# Patient Record
Sex: MALE | Race: WHITE | Employment: FULL TIME | URBAN - METROPOLITAN AREA
[De-identification: names, ages, dates, MRNs, and addresses within clinical notes are randomized per-mention and may not be internally consistent; named-entity substitution may affect disease eponyms.]

---

## 2017-10-21 ENCOUNTER — HOSPITAL ENCOUNTER (EMERGENCY)
Age: 37
Discharge: HOME | End: 2017-10-21
Attending: EMERGENCY MEDICINE
Payer: COMMERCIAL

## 2017-10-21 ENCOUNTER — APPOINTMENT (OUTPATIENT)
Dept: CT IMAGING | Age: 37
End: 2017-10-21
Attending: EMERGENCY MEDICINE
Payer: COMMERCIAL

## 2017-10-21 VITALS
WEIGHT: 210 LBS | OXYGEN SATURATION: 99 % | RESPIRATION RATE: 19 BRPM | HEIGHT: 71 IN | TEMPERATURE: 99.1 F | BODY MASS INDEX: 29.4 KG/M2 | SYSTOLIC BLOOD PRESSURE: 103 MMHG | DIASTOLIC BLOOD PRESSURE: 63 MMHG | HEART RATE: 105 BPM

## 2017-10-21 DIAGNOSIS — D69.6 THROMBOCYTOPENIA (HCC): ICD-10-CM

## 2017-10-21 DIAGNOSIS — R10.32 ABDOMINAL PAIN, LLQ (LEFT LOWER QUADRANT): ICD-10-CM

## 2017-10-21 DIAGNOSIS — R11.2 INTRACTABLE VOMITING WITH NAUSEA, UNSPECIFIED VOMITING TYPE: ICD-10-CM

## 2017-10-21 DIAGNOSIS — D72.819 LEUKOPENIA, UNSPECIFIED TYPE: Primary | ICD-10-CM

## 2017-10-21 LAB
ALBUMIN SERPL-MCNC: 3.9 G/DL (ref 3.5–5)
ALBUMIN/GLOB SERPL: 1 {RATIO} (ref 1.2–3.5)
ALP SERPL-CCNC: 68 U/L (ref 50–136)
ALT SERPL-CCNC: 62 U/L (ref 12–65)
ANION GAP SERPL CALC-SCNC: 11 MMOL/L (ref 7–16)
AST SERPL-CCNC: 73 U/L (ref 15–37)
BASOPHILS # BLD: 0 K/UL (ref 0–0.2)
BASOPHILS NFR BLD: 0 % (ref 0–2)
BILIRUB SERPL-MCNC: 0.5 MG/DL (ref 0.2–1.1)
BUN SERPL-MCNC: 13 MG/DL (ref 6–23)
CALCIUM SERPL-MCNC: 8.6 MG/DL (ref 8.3–10.4)
CHLORIDE SERPL-SCNC: 98 MMOL/L (ref 98–107)
CO2 SERPL-SCNC: 24 MMOL/L (ref 21–32)
CREAT SERPL-MCNC: 1.06 MG/DL (ref 0.8–1.5)
DIFFERENTIAL METHOD BLD: ABNORMAL
EOSINOPHIL # BLD: 0 K/UL (ref 0–0.8)
EOSINOPHIL NFR BLD: 0 % (ref 0.5–7.8)
ERYTHROCYTE [DISTWIDTH] IN BLOOD BY AUTOMATED COUNT: 13.8 % (ref 11.9–14.6)
GLOBULIN SER CALC-MCNC: 3.9 G/DL (ref 2.3–3.5)
GLUCOSE SERPL-MCNC: 101 MG/DL (ref 65–100)
HCT VFR BLD AUTO: 43.2 % (ref 41.1–50.3)
HGB BLD-MCNC: 15 G/DL (ref 13.6–17.2)
HIV1 P24 AG SERPL QL IA: NONREACTIVE
HIV1+2 AB SERPL QL IA: NONREACTIVE
IMM GRANULOCYTES # BLD: 0 K/UL (ref 0–0.5)
IMM GRANULOCYTES NFR BLD: 0 % (ref 0–5)
LACTATE BLD-SCNC: 0.8 MMOL/L (ref 0.5–1.9)
LIPASE SERPL-CCNC: 239 U/L (ref 73–393)
LYMPHOCYTES # BLD: 0.9 K/UL (ref 0.5–4.6)
LYMPHOCYTES NFR BLD: 29 % (ref 13–44)
MCH RBC QN AUTO: 31.5 PG (ref 26.1–32.9)
MCHC RBC AUTO-ENTMCNC: 34.7 G/DL (ref 31.4–35)
MCV RBC AUTO: 90.8 FL (ref 79.6–97.8)
MONOCYTES # BLD: 0.3 K/UL (ref 0.1–1.3)
MONOCYTES NFR BLD: 10 % (ref 4–12)
NEUTS SEG # BLD: 1.9 K/UL (ref 1.7–8.2)
NEUTS SEG NFR BLD: 61 % (ref 43–78)
PLATELET # BLD AUTO: 99 K/UL (ref 150–450)
PMV BLD AUTO: 12.8 FL (ref 10.8–14.1)
POTASSIUM SERPL-SCNC: 3.5 MMOL/L (ref 3.5–5.1)
PROCALCITONIN SERPL-MCNC: <0.1 NG/ML
PROT SERPL-MCNC: 7.8 G/DL (ref 6.3–8.2)
RBC # BLD AUTO: 4.76 M/UL (ref 4.23–5.67)
SODIUM SERPL-SCNC: 133 MMOL/L (ref 136–145)
WBC # BLD AUTO: 3.1 K/UL (ref 4.3–11.1)

## 2017-10-21 PROCEDURE — 85025 COMPLETE CBC W/AUTO DIFF WBC: CPT | Performed by: EMERGENCY MEDICINE

## 2017-10-21 PROCEDURE — 74011000258 HC RX REV CODE- 258: Performed by: EMERGENCY MEDICINE

## 2017-10-21 PROCEDURE — 87040 BLOOD CULTURE FOR BACTERIA: CPT | Performed by: EMERGENCY MEDICINE

## 2017-10-21 PROCEDURE — 93005 ELECTROCARDIOGRAM TRACING: CPT | Performed by: EMERGENCY MEDICINE

## 2017-10-21 PROCEDURE — 83605 ASSAY OF LACTIC ACID: CPT

## 2017-10-21 PROCEDURE — 99284 EMERGENCY DEPT VISIT MOD MDM: CPT | Performed by: EMERGENCY MEDICINE

## 2017-10-21 PROCEDURE — 74177 CT ABD & PELVIS W/CONTRAST: CPT

## 2017-10-21 PROCEDURE — 84145 PROCALCITONIN (PCT): CPT | Performed by: EMERGENCY MEDICINE

## 2017-10-21 PROCEDURE — 83690 ASSAY OF LIPASE: CPT | Performed by: EMERGENCY MEDICINE

## 2017-10-21 PROCEDURE — 96365 THER/PROPH/DIAG IV INF INIT: CPT | Performed by: EMERGENCY MEDICINE

## 2017-10-21 PROCEDURE — 74011636320 HC RX REV CODE- 636/320: Performed by: EMERGENCY MEDICINE

## 2017-10-21 PROCEDURE — 96375 TX/PRO/DX INJ NEW DRUG ADDON: CPT | Performed by: EMERGENCY MEDICINE

## 2017-10-21 PROCEDURE — 96361 HYDRATE IV INFUSION ADD-ON: CPT | Performed by: EMERGENCY MEDICINE

## 2017-10-21 PROCEDURE — 87389 HIV-1 AG W/HIV-1&-2 AB AG IA: CPT | Performed by: EMERGENCY MEDICINE

## 2017-10-21 PROCEDURE — 80053 COMPREHEN METABOLIC PANEL: CPT | Performed by: EMERGENCY MEDICINE

## 2017-10-21 PROCEDURE — 74011250636 HC RX REV CODE- 250/636: Performed by: EMERGENCY MEDICINE

## 2017-10-21 RX ORDER — ONDANSETRON 4 MG/1
4 TABLET, ORALLY DISINTEGRATING ORAL
Qty: 6 TAB | Refills: 1 | Status: SHIPPED | OUTPATIENT
Start: 2017-10-21 | End: 2017-10-25

## 2017-10-21 RX ORDER — SODIUM CHLORIDE 0.9 % (FLUSH) 0.9 %
10 SYRINGE (ML) INJECTION
Status: COMPLETED | OUTPATIENT
Start: 2017-10-21 | End: 2017-10-21

## 2017-10-21 RX ORDER — ESCITALOPRAM OXALATE 10 MG/1
10 TABLET ORAL DAILY
COMMUNITY

## 2017-10-21 RX ORDER — SODIUM CHLORIDE 0.9 % (FLUSH) 0.9 %
5-10 SYRINGE (ML) INJECTION AS NEEDED
Status: DISCONTINUED | OUTPATIENT
Start: 2017-10-21 | End: 2017-10-22 | Stop reason: HOSPADM

## 2017-10-21 RX ORDER — ALPRAZOLAM 1 MG/1
1 TABLET ORAL
COMMUNITY

## 2017-10-21 RX ORDER — ONDANSETRON 2 MG/ML
4 INJECTION INTRAMUSCULAR; INTRAVENOUS
Status: COMPLETED | OUTPATIENT
Start: 2017-10-21 | End: 2017-10-21

## 2017-10-21 RX ORDER — MORPHINE SULFATE 2 MG/ML
4 INJECTION, SOLUTION INTRAMUSCULAR; INTRAVENOUS ONCE
Status: COMPLETED | OUTPATIENT
Start: 2017-10-21 | End: 2017-10-21

## 2017-10-21 RX ADMIN — Medication 10 ML: at 20:32

## 2017-10-21 RX ADMIN — IOPAMIDOL 100 ML: 755 INJECTION, SOLUTION INTRAVENOUS at 20:32

## 2017-10-21 RX ADMIN — SODIUM CHLORIDE 2859 ML: 900 INJECTION, SOLUTION INTRAVENOUS at 18:14

## 2017-10-21 RX ADMIN — PIPERACILLIN SODIUM,TAZOBACTAM SODIUM 4.5 G: 4; .5 INJECTION, POWDER, FOR SOLUTION INTRAVENOUS at 19:34

## 2017-10-21 RX ADMIN — MORPHINE SULFATE 4 MG: 2 INJECTION, SOLUTION INTRAMUSCULAR; INTRAVENOUS at 18:14

## 2017-10-21 RX ADMIN — SODIUM CHLORIDE 100 ML: 900 INJECTION, SOLUTION INTRAVENOUS at 20:32

## 2017-10-21 RX ADMIN — DIATRIZOATE MEGLUMINE AND DIATRIZOATE SODIUM 30 ML: 660; 100 LIQUID ORAL; RECTAL at 18:14

## 2017-10-21 RX ADMIN — ONDANSETRON 4 MG: 2 INJECTION INTRAMUSCULAR; INTRAVENOUS at 18:14

## 2017-10-21 NOTE — ED PROVIDER NOTES
HPI Comments: 33-year-old male with no pertinent past medical history presents with complaint of sharp cramping left lower quadrant pain without radiation ×1 week with associated nausea and vomiting. Patient reports subjective fever at home. States that he has had decreased po intake since Monday. Patient states last meal that he was able to keep down was on Monday reports worsening generalized weakness and fatigue. Denies chest pain, shortness of breath, diarrhea, constipation, melena, bright red blood in stools. Reports intermittent bright red blood with wiping the believes is secondary to straining. He states he's never had symptoms similar to this in the past. Denies previous abdominal surgeries. Patient is a 43 y.o. male presenting with abdominal pain. The history is provided by the patient. No  was used. Abdominal Pain    This is a chronic problem. Episode onset: 1 week. The problem occurs constantly. The problem has not changed since onset. The pain is associated with vomiting. The pain is located in the LLQ. The quality of the pain is cramping and sharp. The pain is at a severity of 4/10. The pain is moderate. Associated symptoms include anorexia, a fever, nausea and vomiting. Pertinent negatives include no belching, no diarrhea, no flatus, no melena, no constipation, no dysuria, no frequency, no hematuria, no headaches, no myalgias, no chest pain, no testicular pain and no back pain. The pain is worsened by palpation. The pain is relieved by nothing. His past medical history does not include gallstones, ulcerative colitis, Crohn's disease, UTI, pancreatitis or kidney stones. The patient's surgical history non-contributory. Past Medical History:   Diagnosis Date    Psychiatric disorder        History reviewed. No pertinent surgical history. History reviewed. No pertinent family history.     Social History     Social History    Marital status: SINGLE     Spouse name: N/A    Number of children: N/A    Years of education: N/A     Occupational History    Not on file. Social History Main Topics    Smoking status: Current Every Day Smoker    Smokeless tobacco: Never Used    Alcohol use No    Drug use: No    Sexual activity: Not on file     Other Topics Concern    Not on file     Social History Narrative    No narrative on file         ALLERGIES: Review of patient's allergies indicates no known allergies. Review of Systems   Constitutional: Positive for fatigue and fever. Negative for chills. HENT: Negative for congestion, facial swelling and sore throat. Respiratory: Negative for cough and shortness of breath. Cardiovascular: Negative for chest pain, palpitations and leg swelling. Gastrointestinal: Positive for abdominal pain, anorexia, nausea and vomiting. Negative for constipation, diarrhea, flatus and melena. Genitourinary: Negative for discharge, dysuria, flank pain, frequency, hematuria, penile pain, penile swelling, scrotal swelling and testicular pain. Musculoskeletal: Negative for back pain, joint swelling, myalgias and neck pain. Skin: Negative for pallor and wound. Neurological: Negative for dizziness, weakness, numbness and headaches. Psychiatric/Behavioral: Negative for agitation and confusion. Vitals:    10/21/17 1729 10/21/17 1818   BP: 118/79 111/59   Pulse: (!) 129 (!) 106   Resp: 19    Temp: (!) 100.5 °F (38.1 °C)    SpO2: 96% 98%   Weight: 95.3 kg (210 lb)    Height: 5' 11\" (1.803 m)             Physical Exam   Constitutional: He is oriented to person, place, and time. He appears well-developed and well-nourished. HENT:   Head: Normocephalic. Mouth/Throat: Oropharynx is clear and moist. No oropharyngeal exudate. Eyes: Conjunctivae and EOM are normal. Pupils are equal, round, and reactive to light. Neck: Normal range of motion. No JVD present. No tracheal deviation present.    Cardiovascular: Regular rhythm, normal heart sounds and intact distal pulses. No murmur heard. Tachycardic. Radial pulses 2+ and equal bilaterally   Pulmonary/Chest: Effort normal and breath sounds normal. No respiratory distress. He has no wheezes. He has no rales. He exhibits no tenderness. Abdominal: Soft. Bowel sounds are normal. He exhibits no distension and no mass. There is tenderness. There is no rebound and no guarding. Moderate LLQ TTP. No CVAT   Musculoskeletal: Normal range of motion. He exhibits no edema, tenderness or deformity. Neurological: He is alert and oriented to person, place, and time. No cranial nerve deficit. Coordination normal.   Skin: Skin is warm and dry. Nursing note and vitals reviewed. MDM  Number of Diagnoses or Management Options  Abdominal pain, LLQ (left lower quadrant): new and requires workup  Intractable vomiting with nausea, unspecified vomiting type: new and requires workup  Leukopenia, unspecified type: new and requires workup  Thrombocytopenia [facility]): new and requires workup  Diagnosis management comments: Patient provide a 30 cc/kg IV fluid bolus + Zosyn IV. Hospitalist consulted for admission given intractable nausea/vomiting. Amount and/or Complexity of Data Reviewed  Clinical lab tests: ordered and reviewed  Tests in the radiology section of CPT®: ordered and reviewed  Tests in the medicine section of CPT®: ordered and reviewed  Review and summarize past medical records: yes  Independent visualization of images, tracings, or specimens: yes    Risk of Complications, Morbidity, and/or Mortality  Presenting problems: moderate  Diagnostic procedures: moderate  Management options: moderate    Patient Progress  Patient progress: stable    ED Course   Comment By Time   30 cc/kg IVF bolus ordered w/ sepsis panel. Pt to be treated w/ Morphine 4mg IV + Zofran 4mg IV for pain control.  Mandy Morales MD 10/21 8460   CT IMPRESSION:    Multiple small lymph nodes in the rectosigmoid junction region. Recommend checking the patient's PSA level. Recommend elective colonoscopy. No definite focal colon mass is identified. Minimal bibasilar atelectasis. Luca Loo MD 10/21 0041   Hospitalist consulted for admission.  Luca Loo MD 10/21 5229       EKG  Date/Time: 10/21/2017 9:08 PM  Performed by: Karl Alicia  Authorized by: Karl Alicia     ECG reviewed by ED Physician in the absence of a cardiologist: yes    Rate:     ECG rate:  98    ECG rate assessment: normal    Rhythm:     Rhythm: sinus rhythm    Ectopy:     Ectopy: none    QRS:     QRS axis:  Normal    QRS intervals:  Normal  Conduction:     Conduction: normal    ST segments:     ST segments:  Normal  T waves:     T waves: normal

## 2017-10-22 ENCOUNTER — HOSPITAL ENCOUNTER (INPATIENT)
Age: 37
LOS: 3 days | Discharge: HOME | DRG: 872 | End: 2017-10-25
Attending: EMERGENCY MEDICINE | Admitting: HOSPITALIST
Payer: SUBSIDIZED

## 2017-10-22 ENCOUNTER — APPOINTMENT (OUTPATIENT)
Dept: GENERAL RADIOLOGY | Age: 37
DRG: 872 | End: 2017-10-22
Attending: EMERGENCY MEDICINE
Payer: SUBSIDIZED

## 2017-10-22 DIAGNOSIS — D72.819 LEUKOPENIA, UNSPECIFIED TYPE: ICD-10-CM

## 2017-10-22 DIAGNOSIS — K75.9 HEPATITIS: ICD-10-CM

## 2017-10-22 DIAGNOSIS — M62.82 NON-TRAUMATIC RHABDOMYOLYSIS: ICD-10-CM

## 2017-10-22 DIAGNOSIS — A41.9 SEPSIS, DUE TO UNSPECIFIED ORGANISM: Primary | ICD-10-CM

## 2017-10-22 DIAGNOSIS — D69.6 THROMBOCYTOPENIA (HCC): ICD-10-CM

## 2017-10-22 PROBLEM — R74.8 ELEVATED LIVER ENZYMES: Status: ACTIVE | Noted: 2017-10-22

## 2017-10-22 PROBLEM — N17.9 AKI (ACUTE KIDNEY INJURY) (HCC): Status: ACTIVE | Noted: 2017-10-22

## 2017-10-22 PROBLEM — F41.9 ANXIETY: Chronic | Status: ACTIVE | Noted: 2017-10-22

## 2017-10-22 LAB
ALBUMIN SERPL-MCNC: 3.6 G/DL (ref 3.5–5)
ALBUMIN/GLOB SERPL: 1.1 {RATIO} (ref 1.2–3.5)
ALP SERPL-CCNC: 60 U/L (ref 50–136)
ALT SERPL-CCNC: 78 U/L (ref 12–65)
AMPHET UR QL SCN: NEGATIVE
ANION GAP SERPL CALC-SCNC: 10 MMOL/L (ref 7–16)
APAP SERPL-MCNC: 15 UG/ML (ref 10–30)
APPEARANCE UR: CLEAR
AST SERPL-CCNC: 133 U/L (ref 15–37)
ATRIAL RATE: 98 BPM
BARBITURATES UR QL SCN: NEGATIVE
BASOPHILS # BLD: 0 K/UL (ref 0–0.2)
BASOPHILS NFR BLD: 1 % (ref 0–2)
BENZODIAZ UR QL: POSITIVE
BILIRUB SERPL-MCNC: 0.4 MG/DL (ref 0.2–1.1)
BILIRUB UR QL: NEGATIVE
BUN SERPL-MCNC: 6 MG/DL (ref 6–23)
CALCIUM SERPL-MCNC: 7.9 MG/DL (ref 8.3–10.4)
CALCULATED P AXIS, ECG09: 45 DEGREES
CALCULATED R AXIS, ECG10: 44 DEGREES
CALCULATED T AXIS, ECG11: 8 DEGREES
CANNABINOIDS UR QL SCN: POSITIVE
CHLORIDE SERPL-SCNC: 100 MMOL/L (ref 98–107)
CK SERPL-CCNC: 2397 U/L (ref 21–215)
CO2 SERPL-SCNC: 25 MMOL/L (ref 21–32)
COCAINE UR QL SCN: NEGATIVE
COLOR UR: YELLOW
CREAT SERPL-MCNC: 1.17 MG/DL (ref 0.8–1.5)
CRP SERPL-MCNC: <0.3 MG/DL (ref 0–0.9)
DIAGNOSIS, 93000: NORMAL
DIFFERENTIAL METHOD BLD: ABNORMAL
EOSINOPHIL # BLD: 0 K/UL (ref 0–0.8)
EOSINOPHIL NFR BLD: 0 % (ref 0.5–7.8)
ERYTHROCYTE [DISTWIDTH] IN BLOOD BY AUTOMATED COUNT: 13.6 % (ref 11.9–14.6)
ETHANOL SERPL-MCNC: <3 MG/DL
FLUAV AG NPH QL IA: NEGATIVE
FLUBV AG NPH QL IA: NEGATIVE
GGT SERPL-CCNC: 46 U/L (ref 15–85)
GLOBULIN SER CALC-MCNC: 3.4 G/DL (ref 2.3–3.5)
GLUCOSE SERPL-MCNC: 124 MG/DL (ref 65–100)
GLUCOSE UR STRIP.AUTO-MCNC: NEGATIVE MG/DL
HCT VFR BLD AUTO: 40 % (ref 41.1–50.3)
HETEROPH AB SER QL: NEGATIVE
HGB BLD-MCNC: 13.9 G/DL (ref 13.6–17.2)
HGB UR QL STRIP: NEGATIVE
IMM GRANULOCYTES # BLD: 0 K/UL (ref 0–0.5)
IMM GRANULOCYTES NFR BLD: 0 % (ref 0–5)
KETONES UR QL STRIP.AUTO: NEGATIVE MG/DL
LACTATE BLD-SCNC: 3.1 MMOL/L (ref 0.5–1.9)
LACTATE SERPL-SCNC: 1 MMOL/L (ref 0.4–2)
LEUKOCYTE ESTERASE UR QL STRIP.AUTO: NEGATIVE
LYMPHOCYTES # BLD: 0.5 K/UL (ref 0.5–4.6)
LYMPHOCYTES NFR BLD: 23 % (ref 13–44)
MAGNESIUM SERPL-MCNC: 1.8 MG/DL (ref 1.8–2.4)
MCH RBC QN AUTO: 31.7 PG (ref 26.1–32.9)
MCHC RBC AUTO-ENTMCNC: 34.8 G/DL (ref 31.4–35)
MCV RBC AUTO: 91.1 FL (ref 79.6–97.8)
METHADONE UR QL: NEGATIVE
MONOCYTES # BLD: 0.2 K/UL (ref 0.1–1.3)
MONOCYTES NFR BLD: 9 % (ref 4–12)
NEUTS SEG # BLD: 1.5 K/UL (ref 1.7–8.2)
NEUTS SEG NFR BLD: 67 % (ref 43–78)
NITRITE UR QL STRIP.AUTO: NEGATIVE
OPIATES UR QL: NEGATIVE
P-R INTERVAL, ECG05: 140 MS
PCP UR QL: NEGATIVE
PH UR STRIP: 6 [PH] (ref 5–9)
PLATELET # BLD AUTO: 65 K/UL (ref 150–450)
PMV BLD AUTO: 12.3 FL (ref 10.8–14.1)
POTASSIUM SERPL-SCNC: 3.8 MMOL/L (ref 3.5–5.1)
PROCALCITONIN SERPL-MCNC: <0.1 NG/ML
PROT SERPL-MCNC: 7 G/DL (ref 6.3–8.2)
PROT UR STRIP-MCNC: NEGATIVE MG/DL
Q-T INTERVAL, ECG07: 318 MS
QRS DURATION, ECG06: 78 MS
QTC CALCULATION (BEZET), ECG08: 405 MS
RBC # BLD AUTO: 4.39 M/UL (ref 4.23–5.67)
SODIUM SERPL-SCNC: 135 MMOL/L (ref 136–145)
SP GR UR REFRACTOMETRY: 1.01 (ref 1–1.02)
TROPONIN I SERPL-MCNC: 0.02 NG/ML (ref 0.02–0.05)
UROBILINOGEN UR QL STRIP.AUTO: 0.2 EU/DL (ref 0.2–1)
VENTRICULAR RATE, ECG03: 98 BPM
WBC # BLD AUTO: 2.2 K/UL (ref 4.3–11.1)

## 2017-10-22 PROCEDURE — 99285 EMERGENCY DEPT VISIT HI MDM: CPT | Performed by: EMERGENCY MEDICINE

## 2017-10-22 PROCEDURE — 87040 BLOOD CULTURE FOR BACTERIA: CPT | Performed by: EMERGENCY MEDICINE

## 2017-10-22 PROCEDURE — 86592 SYPHILIS TEST NON-TREP QUAL: CPT | Performed by: HOSPITALIST

## 2017-10-22 PROCEDURE — 80307 DRUG TEST PRSMV CHEM ANLYZR: CPT | Performed by: HOSPITALIST

## 2017-10-22 PROCEDURE — 80307 DRUG TEST PRSMV CHEM ANLYZR: CPT | Performed by: EMERGENCY MEDICINE

## 2017-10-22 PROCEDURE — 83605 ASSAY OF LACTIC ACID: CPT | Performed by: HOSPITALIST

## 2017-10-22 PROCEDURE — 85025 COMPLETE CBC W/AUTO DIFF WBC: CPT | Performed by: EMERGENCY MEDICINE

## 2017-10-22 PROCEDURE — 85652 RBC SED RATE AUTOMATED: CPT | Performed by: HOSPITALIST

## 2017-10-22 PROCEDURE — 83605 ASSAY OF LACTIC ACID: CPT

## 2017-10-22 PROCEDURE — 74011000302 HC RX REV CODE- 302: Performed by: HOSPITALIST

## 2017-10-22 PROCEDURE — 96365 THER/PROPH/DIAG IV INF INIT: CPT | Performed by: EMERGENCY MEDICINE

## 2017-10-22 PROCEDURE — 87804 INFLUENZA ASSAY W/OPTIC: CPT | Performed by: EMERGENCY MEDICINE

## 2017-10-22 PROCEDURE — 82977 ASSAY OF GGT: CPT | Performed by: HOSPITALIST

## 2017-10-22 PROCEDURE — 86140 C-REACTIVE PROTEIN: CPT | Performed by: HOSPITALIST

## 2017-10-22 PROCEDURE — 71010 XR CHEST PORT: CPT

## 2017-10-22 PROCEDURE — 77030032490 HC SLV COMPR SCD KNE COVD -B

## 2017-10-22 PROCEDURE — 84484 ASSAY OF TROPONIN QUANT: CPT | Performed by: HOSPITALIST

## 2017-10-22 PROCEDURE — 80074 ACUTE HEPATITIS PANEL: CPT | Performed by: EMERGENCY MEDICINE

## 2017-10-22 PROCEDURE — 84145 PROCALCITONIN (PCT): CPT | Performed by: EMERGENCY MEDICINE

## 2017-10-22 PROCEDURE — 74011000258 HC RX REV CODE- 258: Performed by: EMERGENCY MEDICINE

## 2017-10-22 PROCEDURE — 74011250637 HC RX REV CODE- 250/637: Performed by: HOSPITALIST

## 2017-10-22 PROCEDURE — 80053 COMPREHEN METABOLIC PANEL: CPT | Performed by: EMERGENCY MEDICINE

## 2017-10-22 PROCEDURE — 82550 ASSAY OF CK (CPK): CPT | Performed by: EMERGENCY MEDICINE

## 2017-10-22 PROCEDURE — 83735 ASSAY OF MAGNESIUM: CPT | Performed by: EMERGENCY MEDICINE

## 2017-10-22 PROCEDURE — 93005 ELECTROCARDIOGRAM TRACING: CPT | Performed by: EMERGENCY MEDICINE

## 2017-10-22 PROCEDURE — 81003 URINALYSIS AUTO W/O SCOPE: CPT | Performed by: EMERGENCY MEDICINE

## 2017-10-22 PROCEDURE — 96361 HYDRATE IV INFUSION ADD-ON: CPT | Performed by: EMERGENCY MEDICINE

## 2017-10-22 PROCEDURE — 74011250636 HC RX REV CODE- 250/636: Performed by: HOSPITALIST

## 2017-10-22 PROCEDURE — 96375 TX/PRO/DX INJ NEW DRUG ADDON: CPT | Performed by: EMERGENCY MEDICINE

## 2017-10-22 PROCEDURE — 65270000029 HC RM PRIVATE

## 2017-10-22 PROCEDURE — 86580 TB INTRADERMAL TEST: CPT | Performed by: HOSPITALIST

## 2017-10-22 PROCEDURE — 86308 HETEROPHILE ANTIBODY SCREEN: CPT | Performed by: HOSPITALIST

## 2017-10-22 PROCEDURE — 74011000250 HC RX REV CODE- 250: Performed by: HOSPITALIST

## 2017-10-22 PROCEDURE — 74011250637 HC RX REV CODE- 250/637: Performed by: EMERGENCY MEDICINE

## 2017-10-22 PROCEDURE — 74011250636 HC RX REV CODE- 250/636: Performed by: EMERGENCY MEDICINE

## 2017-10-22 PROCEDURE — 87086 URINE CULTURE/COLONY COUNT: CPT | Performed by: EMERGENCY MEDICINE

## 2017-10-22 RX ORDER — SODIUM CHLORIDE 0.9 % (FLUSH) 0.9 %
5-10 SYRINGE (ML) INJECTION AS NEEDED
Status: DISCONTINUED | OUTPATIENT
Start: 2017-10-22 | End: 2017-10-24

## 2017-10-22 RX ORDER — ACETAMINOPHEN 325 MG/1
650 TABLET ORAL
Status: DISCONTINUED | OUTPATIENT
Start: 2017-10-22 | End: 2017-10-25 | Stop reason: HOSPADM

## 2017-10-22 RX ORDER — IBUPROFEN 200 MG
200 TABLET ORAL
COMMUNITY
End: 2017-10-25

## 2017-10-22 RX ORDER — SODIUM CHLORIDE 0.9 % (FLUSH) 0.9 %
5-10 SYRINGE (ML) INJECTION AS NEEDED
Status: DISCONTINUED | OUTPATIENT
Start: 2017-10-22 | End: 2017-10-25 | Stop reason: HOSPADM

## 2017-10-22 RX ORDER — ONDANSETRON 2 MG/ML
4 INJECTION INTRAMUSCULAR; INTRAVENOUS
Status: COMPLETED | OUTPATIENT
Start: 2017-10-22 | End: 2017-10-22

## 2017-10-22 RX ORDER — ONDANSETRON 2 MG/ML
4 INJECTION INTRAMUSCULAR; INTRAVENOUS
Status: DISCONTINUED | OUTPATIENT
Start: 2017-10-22 | End: 2017-10-25 | Stop reason: HOSPADM

## 2017-10-22 RX ORDER — MORPHINE SULFATE 2 MG/ML
2 INJECTION, SOLUTION INTRAMUSCULAR; INTRAVENOUS
Status: DISCONTINUED | OUTPATIENT
Start: 2017-10-22 | End: 2017-10-25 | Stop reason: HOSPADM

## 2017-10-22 RX ORDER — ACETAMINOPHEN 500 MG
1000 TABLET ORAL
Status: COMPLETED | OUTPATIENT
Start: 2017-10-22 | End: 2017-10-22

## 2017-10-22 RX ORDER — OXYCODONE HYDROCHLORIDE 5 MG/1
5 TABLET ORAL
Status: DISCONTINUED | OUTPATIENT
Start: 2017-10-22 | End: 2017-10-25 | Stop reason: HOSPADM

## 2017-10-22 RX ORDER — ALPRAZOLAM 0.5 MG/1
1 TABLET ORAL
Status: DISCONTINUED | OUTPATIENT
Start: 2017-10-22 | End: 2017-10-25 | Stop reason: HOSPADM

## 2017-10-22 RX ORDER — IBUPROFEN 200 MG
1 TABLET ORAL DAILY
Status: DISCONTINUED | OUTPATIENT
Start: 2017-10-23 | End: 2017-10-22 | Stop reason: SDUPTHER

## 2017-10-22 RX ORDER — VANCOMYCIN 2 GRAM/500 ML IN 0.9 % SODIUM CHLORIDE INTRAVENOUS
2000 ONCE
Status: COMPLETED | OUTPATIENT
Start: 2017-10-22 | End: 2017-10-23

## 2017-10-22 RX ORDER — SODIUM CHLORIDE 0.9 % (FLUSH) 0.9 %
5-10 SYRINGE (ML) INJECTION EVERY 8 HOURS
Status: DISCONTINUED | OUTPATIENT
Start: 2017-10-23 | End: 2017-10-25 | Stop reason: HOSPADM

## 2017-10-22 RX ORDER — HEPARIN SODIUM 5000 [USP'U]/ML
5000 INJECTION, SOLUTION INTRAVENOUS; SUBCUTANEOUS EVERY 8 HOURS
Status: CANCELLED | OUTPATIENT
Start: 2017-10-22

## 2017-10-22 RX ORDER — IBUPROFEN 200 MG
1 TABLET ORAL EVERY 24 HOURS
Status: DISCONTINUED | OUTPATIENT
Start: 2017-10-22 | End: 2017-10-25 | Stop reason: HOSPADM

## 2017-10-22 RX ORDER — SODIUM CHLORIDE 9 MG/ML
125 INJECTION, SOLUTION INTRAVENOUS CONTINUOUS
Status: DISCONTINUED | OUTPATIENT
Start: 2017-10-22 | End: 2017-10-25 | Stop reason: HOSPADM

## 2017-10-22 RX ADMIN — TUBERCULIN PURIFIED PROTEIN DERIVATIVE 5 UNITS: 5 INJECTION INTRADERMAL at 23:48

## 2017-10-22 RX ADMIN — VANCOMYCIN HYDROCHLORIDE 2000 MG: 10 INJECTION, POWDER, LYOPHILIZED, FOR SOLUTION INTRAVENOUS at 22:11

## 2017-10-22 RX ADMIN — SODIUM CHLORIDE 1000 ML: 900 INJECTION, SOLUTION INTRAVENOUS at 20:11

## 2017-10-22 RX ADMIN — ONDANSETRON HYDROCHLORIDE 4 MG: 2 INJECTION INTRAMUSCULAR; INTRAVENOUS at 20:39

## 2017-10-22 RX ADMIN — FAMOTIDINE 20 MG: 10 INJECTION, SOLUTION INTRAVENOUS at 23:45

## 2017-10-22 RX ADMIN — Medication 10 ML: at 23:48

## 2017-10-22 RX ADMIN — SODIUM CHLORIDE 1860 ML: 900 INJECTION, SOLUTION INTRAVENOUS at 20:39

## 2017-10-22 RX ADMIN — PIPERACILLIN SODIUM,TAZOBACTAM SODIUM 4.5 G: 4; .5 INJECTION, POWDER, FOR SOLUTION INTRAVENOUS at 20:39

## 2017-10-22 RX ADMIN — ACETAMINOPHEN 1000 MG: 500 TABLET, FILM COATED ORAL at 20:23

## 2017-10-22 RX ADMIN — SODIUM CHLORIDE 125 ML/HR: 900 INJECTION, SOLUTION INTRAVENOUS at 23:55

## 2017-10-22 NOTE — IP AVS SNAPSHOT
April Canchola 
 
 
 [address] [facility] [address] [facility] 
[phone] Patient: Kathleen Brown MRN: [id number] :1980 About your hospitalization You were admitted on:  2017 You last received care in the:  [facility] You were discharged on:  2017 Why you were hospitalized Your primary diagnosis was:  Sepsis (Hcc) Your diagnoses also included:  Rhabdomyolysis, Italo (Acute Kidney Injury) (Hcc), Leukopenia, Thrombocytopenia (Hcc), Elevated Liver Enzymes, Anxiety Things You Need To Do (next 8 weeks) Follow up with keep your scheduled appointment with GI  Follow up with Noelle Kumar NP  
2:00 pm   
  
Phone:  [phone] Where:  [facility], [address] [place], [place] [id number] Discharge Orders None A check angelica indicates which time of day the medication should be taken. My Medications STOP taking these medications ADVIL 200 mg tablet Generic drug:  ibuprofen  
   
  
 ondansetron 4 mg disintegrating tablet Commonly known as:  ZOFRAN ODT  
   
  
  
TAKE these medications as instructed Instructions Each Dose to Equal  
 Morning Noon Evening Bedtime LEXAPRO 10 mg tablet Generic drug:  escitalopram oxalate Your next dose is:  Tomorrow Morning Take 10 mg by mouth daily. 10 mg  
    
  
   
   
   
  
 XANAX 1 mg tablet Generic drug:  ALPRAZolam  
Your next dose is: Take on as needed schedule Take 1 mg by mouth three (3) times daily as needed for Anxiety. 1 mg Discharge Instructions DISCHARGE SUMMARY from Nurse PATIENT INSTRUCTIONS: 
 
 
F-face looks uneven A-arms unable to move or move unevenly S-speech slurred or non-existent T-time-call 911 as soon as signs and symptoms begin-DO NOT go Back to bed or wait to see if you get better-TIME IS BRAIN. Warning Signs of HEART ATTACK Call 911 if you have these symptoms: 
? Chest discomfort. Most heart attacks involve discomfort in the center of the chest that lasts more than a few minutes, or that goes away and comes back. It can feel like uncomfortable pressure, squeezing, fullness, or pain. ? Discomfort in other areas of the upper body. Symptoms can include pain or discomfort in one or both arms, the back, neck, jaw, or stomach. ? Shortness of breath with or without chest discomfort. ? Other signs may include breaking out in a cold sweat, nausea, or lightheadedness. Don't wait more than five minutes to call [address]! Fast action can save your life. Calling 911 is almost always the fastest way to get lifesaving treatment. Emergency Medical Services staff can begin treatment when they arrive  up to an hour sooner than if someone gets to the hospital by car. The discharge information has been reviewed with the {PATIENT PARENT GUARDIAN:74660}. The {PATIENT PARENT GUARDIAN:48211} verbalized understanding. Discharge medications reviewed with the {Dishcarge meds reviewed DGBQ:06618} and appropriate educational materials and side effects teaching were provided. ___________________________________________________________________________________________________________________________________ Introducing [facility] SERVICES! Kerri Wilson introduces [employer] patient portal. Now you can access parts of your medical record, email your doctor's office, and request medication refills online. 1. In your internet browser, go to [email or web address] 2. Click on the First Time User? Click Here link in the Sign In box. You will see the New Member Sign Up page. 3. Enter your Synchronica Access Code exactly as it appears below. You will not need to use this code after youve completed the sign-up process. If you do not sign up before the expiration date, you must request a new code. · Synchronica Access Code: [facility] Expires: 1/19/2018  5:06 PM 
 
4. Enter the last four digits of your Social Security Number (xxxx) and Date of Birth (mm/dd/yyyy) as indicated and click Submit. You will be taken to the next sign-up page. 5. Create a Synchronica ID. This will be your Synchronica login ID and cannot be changed, so think of one that is secure and easy to remember. 6. Create a Synchronica password. You can change your password at any time. 7. Enter your Password Reset Question and Answer. This can be used at a later time if you forget your password. 8. Enter your e-mail address. You will receive e-mail notification when new information is available in [address] 9. Click Sign Up. You can now view and download portions of your medical record. 10. Click the Download Summary menu link to download a portable copy of your medical information. If you have questions, please visit the Frequently Asked Questions section of the Synchronica website. Remember, Synchronica is NOT to be used for urgent needs. For medical emergencies, dial 911. Now available from your iPhone and Android! Unresulted Labs-Please follow up with your PCP about these lab tests Order Current Status CMV AB, IGG/IGM In process HIV-1 RNA QT BY PCR In process CULTURE, BLOOD Preliminary result CULTURE, BLOOD Preliminary result CULTURE, STOOL Preliminary result CHETNA BARR VIRUS AB PANEL Preliminary result PATHOLOGIST REVIEW SMEARS Preliminary result Providers Seen During Your Hospitalization Provider Specialty Primary office phone Demetria Garsia MD Emergency Medicine [phone] Rukhsana Back MD [facility] [phone] Caitlyn Tena MD Internal Medicine [phone] Immunizations Administered for This Admission Name Date Influenza Vaccine (Quad) PF  Deferred () TB Skin Test (PPD) Intradermal  Deferred (),  Deferred (), 10/22/2017 Your Primary Care Physician (PCP) Primary Care Physician Office Phone Office Fax NONE ** None ** ** None ** You are allergic to the following No active allergies Recent Documentation Height Weight BMI Smoking Status 1.803 m 96.6 kg 29.71 kg/m2 Current Every Day Smoker Patient Belongings The following personal items are in your possession at time of discharge: 
     Visual Aid: None      Home Medications: None      Clothing: At bedside, [address], Footwear, [place], [place], Shirt, Undergarments    Other Valuables: Angelo Please provide this summary of care documentation to your next provider. Signatures-by signing, you are acknowledging that this After Visit Summary has been reviewed with you and you have received a copy. Patient Signature:  ____________________________________________________________ Date:  ____________________________________________________________  
  
Judith Prakash Provider Signature:  ____________________________________________________________ Date:  ____________________________________________________________

## 2017-10-22 NOTE — DISCHARGE INSTRUCTIONS
Abdominal Pain: Care Instructions  Your Care Instructions    Abdominal pain has many possible causes. Some aren't serious and get better on their own in a few days. Others need more testing and treatment. If your pain continues or gets worse, you need to be rechecked and may need more tests to find out what is wrong. You may need surgery to correct the problem. Don't ignore new symptoms, such as fever, nausea and vomiting, urination problems, pain that gets worse, and dizziness. These may be signs of a more serious problem. Your doctor may have recommended a follow-up visit in the next 8 to 12 hours. If you are not getting better, you may need more tests or treatment. The doctor has checked you carefully, but problems can develop later. If you notice any problems or new symptoms, get medical treatment right away. Follow-up care is a key part of your treatment and safety. Be sure to make and go to all appointments, and call your doctor if you are having problems. It's also a good idea to know your test results and keep a list of the medicines you take. How can you care for yourself at home? · Rest until you feel better. · To prevent dehydration, drink plenty of fluids, enough so that your urine is light yellow or clear like water. Choose water and other caffeine-free clear liquids until you feel better. If you have kidney, heart, or liver disease and have to limit fluids, talk with your doctor before you increase the amount of fluids you drink. · If your stomach is upset, eat mild foods, such as rice, dry toast or crackers, bananas, and applesauce. Try eating several small meals instead of two or three large ones. · Wait until 48 hours after all symptoms have gone away before you have spicy foods, alcohol, and drinks that contain caffeine. · Do not eat foods that are high in fat. · Avoid anti-inflammatory medicines such as aspirin, ibuprofen (Advil, Motrin), and naproxen (Aleve).  These can cause stomach upset. Talk to your doctor if you take daily aspirin for another health problem. When should you call for help? Call 911 anytime you think you may need emergency care. For example, call if:  · You passed out (lost consciousness). · You pass maroon or very bloody stools. · You vomit blood or what looks like coffee grounds. · You have new, severe belly pain. Call your doctor now or seek immediate medical care if:  · Your pain gets worse, especially if it becomes focused in one area of your belly. · You have a new or higher fever. · Your stools are black and look like tar, or they have streaks of blood. · You have unexpected vaginal bleeding. · You have symptoms of a urinary tract infection. These may include:  ¨ Pain when you urinate. ¨ Urinating more often than usual.  ¨ Blood in your urine. · You are dizzy or lightheaded, or you feel like you may faint. Watch closely for changes in your health, and be sure to contact your doctor if:  · You are not getting better after 1 day (24 hours). Where can you learn more? Go to http://salvatorePuridifytobin.info/. Enter X297 in the search box to learn more about \"Abdominal Pain: Care Instructions. \"  Current as of: March 20, 2017  Content Version: 11.3  © 3349-3243 [employer]. Care instructions adapted under license by [employer] (which disclaims liability or warranty for this information). If you have questions about a medical condition or this instruction, always ask your healthcare professional. [place] [address] any warranty or liability for your use of this information. Nausea and Vomiting: Care Instructions  Your Care Instructions    When you are nauseated, you may feel weak and sweaty and notice a lot of saliva in your mouth. Nausea often leads to vomiting. Most of the time you do not need to worry about nausea and vomiting, but they can be signs of other illnesses.   Two common causes of nausea and vomiting are stomach flu and food poisoning. Nausea and vomiting from viral stomach flu will usually start to improve within 24 hours. Nausea and vomiting from food poisoning may last from 12 to 48 hours. The doctor has checked you carefully, but problems can develop later. If you notice any problems or new symptoms, get medical treatment right away. Follow-up care is a key part of your treatment and safety. Be sure to make and go to all appointments, and call your doctor if you are having problems. It's also a good idea to know your test results and keep a list of the medicines you take. How can you care for yourself at home? · To prevent dehydration, drink plenty of fluids, enough so that your urine is light yellow or clear like water. Choose water and other caffeine-free clear liquids until you feel better. If you have kidney, heart, or liver disease and have to limit fluids, talk with your doctor before you increase the amount of fluids you drink. · Rest in bed until you feel better. · When you are able to eat, try clear soups, mild foods, and liquids until all symptoms are gone for 12 to 48 hours. Other good choices include dry toast, crackers, cooked cereal, and gelatin dessert, such as Jell-O. When should you call for help? Call 911 anytime you think you may need emergency care. For example, call if:  · You passed out (lost consciousness). Call your doctor now or seek immediate medical care if:  · You have symptoms of dehydration, such as:  ¨ Dry eyes and a dry mouth. ¨ Passing only a little dark urine. ¨ Feeling thirstier than usual.  · You have new or worsening belly pain. · You have a new or higher fever. · You vomit blood or what looks like coffee grounds. Watch closely for changes in your health, and be sure to contact your doctor if:  · You have ongoing nausea and vomiting. · Your vomiting is getting worse. · Your vomiting lasts longer than 2 days.   · You are not getting better as expected. Where can you learn more? Go to http://salvatore-tobin.info/. Enter 25 [id number] in the search box to learn more about \"Nausea and Vomiting: Care Instructions. \"  Current as of: March 20, 2017  Content Version: 11.3  © 5849-3328 [employer]. Care instructions adapted under license by [employer] (which disclaims liability or warranty for this information). If you have questions about a medical condition or this instruction, always ask your healthcare professional. [address] any warranty or liability for your use of this information. Thrombocytopenia: Care Instructions  Your Care Instructions    Thrombocytopenia is a low number of platelets in the blood. Platelets are the cells that help blood clot. If you don't have enough of them, your blood cannot clot well. So it is harder to stop bleeding. You may have low platelets because your bone marrow does not make them. Or your body's defenses (immune system) may destroy them. Having an enlarged spleen can also reduce the number of platelets in your blood. This is because they can get trapped in the enlarged spleen. Some diseases or medicines may also cause low platelets. But platelets may go back to normal levels if the disease is treated or the medicine is stopped. You may not need treatment if your problem is mild. If you do need treatment, you may have platelets added to your blood. Or you may get medicine to stop the loss of platelets or help your body make them. Follow-up care is a key part of your treatment and safety. Be sure to make and go to all appointments, and call your doctor if you are having problems. It's also a good idea to know your test results and keep a list of the medicines you take. How can you care for yourself at home? · Be safe with medicines. Take your medicines exactly as prescribed.  Call your doctor if you think you are having a problem with your medicine. · Do not take aspirin or anti-inflammatory medicines unless your doctor says it is okay. Examples are ibuprofen (Advil, Motrin) and naproxen (Aleve). They may increase the risk of bleeding. · Avoid contact sports or activities that could cause you to fall. When should you call for help? Call 911 anytime you think you may need emergency care. For example, call if:  · You have symptoms of a stroke. These may include:  ¨ Sudden numbness, tingling, weakness, or loss of movement in your face, arm, or leg, especially on only one side of your body. ¨ Sudden vision changes. ¨ Sudden trouble speaking. ¨ Sudden confusion or trouble understanding simple statements. ¨ Sudden problems with walking or balance. ¨ A sudden, severe headache that is different from past headaches. Call your doctor now or seek immediate medical care if:  · You have any abnormal bleeding, such as:  ¨ Nosebleeds. ¨ Vaginal bleeding that is different (heavier, more frequent, at a different time of the month) than what you are used to. ¨ Bloody or black stools, or rectal bleeding. ¨ Bloody or pink urine. · You have severe pain that does not get better. Watch closely for changes in your health, and be sure to contact your doctor if:  · You do not get better as expected. Where can you learn more? Go to http://salvatore-tobin.info/. Enter I865 in the search box to learn more about \"Thrombocytopenia: Care Instructions. \"  Current as of: October 13, 2016  Content Version: 11.3  © 9140-7487 [employer]. Care instructions adapted under license by [employer] (which disclaims liability or warranty for this information). If you have questions about a medical condition or this instruction, always ask your healthcare professional. [address] any warranty or liability for your use of this information.

## 2017-10-23 ENCOUNTER — APPOINTMENT (OUTPATIENT)
Dept: ULTRASOUND IMAGING | Age: 37
DRG: 872 | End: 2017-10-23
Attending: HOSPITALIST
Payer: SUBSIDIZED

## 2017-10-23 LAB
ALBUMIN SERPL-MCNC: 3 G/DL (ref 3.5–5)
ALBUMIN/GLOB SERPL: 1 {RATIO} (ref 1.2–3.5)
ALP SERPL-CCNC: 52 U/L (ref 50–136)
ALT SERPL-CCNC: 72 U/L (ref 12–65)
ANION GAP SERPL CALC-SCNC: 5 MMOL/L (ref 7–16)
ANION GAP SERPL CALC-SCNC: 7 MMOL/L (ref 7–16)
AST SERPL-CCNC: 138 U/L (ref 15–37)
ATRIAL RATE: 118 BPM
BASOPHILS # BLD: 0 K/UL (ref 0–0.2)
BASOPHILS NFR BLD: 0 % (ref 0–2)
BILIRUB SERPL-MCNC: 0.4 MG/DL (ref 0.2–1.1)
BUN SERPL-MCNC: 3 MG/DL (ref 6–23)
BUN SERPL-MCNC: 4 MG/DL (ref 6–23)
CALCIUM SERPL-MCNC: 7.4 MG/DL (ref 8.3–10.4)
CALCIUM SERPL-MCNC: 7.5 MG/DL (ref 8.3–10.4)
CALCULATED P AXIS, ECG09: 52 DEGREES
CALCULATED R AXIS, ECG10: 52 DEGREES
CALCULATED T AXIS, ECG11: 23 DEGREES
CHLORIDE SERPL-SCNC: 107 MMOL/L (ref 98–107)
CHLORIDE SERPL-SCNC: 109 MMOL/L (ref 98–107)
CK SERPL-CCNC: 2560 U/L (ref 21–215)
CO2 SERPL-SCNC: 26 MMOL/L (ref 21–32)
CO2 SERPL-SCNC: 29 MMOL/L (ref 21–32)
CREAT SERPL-MCNC: 0.85 MG/DL (ref 0.8–1.5)
CREAT SERPL-MCNC: 0.97 MG/DL (ref 0.8–1.5)
DIAGNOSIS, 93000: NORMAL
DIFFERENTIAL METHOD BLD: ABNORMAL
EOSINOPHIL # BLD: 0 K/UL (ref 0–0.8)
EOSINOPHIL NFR BLD: 0 % (ref 0.5–7.8)
ERYTHROCYTE [DISTWIDTH] IN BLOOD BY AUTOMATED COUNT: 13.9 % (ref 11.9–14.6)
ERYTHROCYTE [SEDIMENTATION RATE] IN BLOOD: 12 MM/HR (ref 0–15)
GLOBULIN SER CALC-MCNC: 3 G/DL (ref 2.3–3.5)
GLUCOSE SERPL-MCNC: 115 MG/DL (ref 65–100)
GLUCOSE SERPL-MCNC: 95 MG/DL (ref 65–100)
HCT VFR BLD AUTO: 37.2 % (ref 41.1–50.3)
HGB BLD-MCNC: 12.5 G/DL (ref 13.6–17.2)
HGB RETIC QN AUTO: 28 PG (ref 29–35)
IMM GRANULOCYTES # BLD: 0 K/UL (ref 0–0.5)
IMM GRANULOCYTES NFR BLD: 0 % (ref 0–5)
IMM RETICS NFR: 1.9 % (ref 2.3–13.4)
INR PPP: 1.1 (ref 0.9–1.2)
LACTATE SERPL-SCNC: 0.8 MMOL/L (ref 0.4–2)
LYMPHOCYTES # BLD: 0.6 K/UL (ref 0.5–4.6)
LYMPHOCYTES NFR BLD: 36 % (ref 13–44)
MAGNESIUM SERPL-MCNC: 1.9 MG/DL (ref 1.8–2.4)
MAGNESIUM SERPL-MCNC: 2 MG/DL (ref 1.8–2.4)
MCH RBC QN AUTO: 31.3 PG (ref 26.1–32.9)
MCHC RBC AUTO-ENTMCNC: 33.6 G/DL (ref 31.4–35)
MCV RBC AUTO: 93 FL (ref 79.6–97.8)
MM INDURATION POC: 0 MM (ref 0–5)
MONOCYTES # BLD: 0.1 K/UL (ref 0.1–1.3)
MONOCYTES NFR BLD: 6 % (ref 4–12)
NEUTS SEG # BLD: 1 K/UL (ref 1.7–8.2)
NEUTS SEG NFR BLD: 58 % (ref 43–78)
P-R INTERVAL, ECG05: 134 MS
PHOSPHATE SERPL-MCNC: 1.3 MG/DL (ref 2.5–4.5)
PLATELET # BLD AUTO: 57 K/UL (ref 150–450)
PMV BLD AUTO: 12.2 FL (ref 10.8–14.1)
POTASSIUM SERPL-SCNC: 3.6 MMOL/L (ref 3.5–5.1)
POTASSIUM SERPL-SCNC: 4 MMOL/L (ref 3.5–5.1)
PPD POC: NORMAL NEGATIVE
PROT SERPL-MCNC: 6 G/DL (ref 6.3–8.2)
PROTHROMBIN TIME: 11.6 SEC (ref 9.6–12)
PSA SERPL-MCNC: 8.3 NG/ML
Q-T INTERVAL, ECG07: 296 MS
QRS DURATION, ECG06: 74 MS
QTC CALCULATION (BEZET), ECG08: 414 MS
RBC # BLD AUTO: 4 M/UL (ref 4.23–5.67)
RETICS # AUTO: 0.03 M/UL (ref 0.03–0.1)
RETICS/RBC NFR AUTO: 0.6 % (ref 0.3–2)
RPR SER QL: NONREACTIVE
SODIUM SERPL-SCNC: 141 MMOL/L (ref 136–145)
SODIUM SERPL-SCNC: 142 MMOL/L (ref 136–145)
TSH SERPL DL<=0.005 MIU/L-ACNC: 2.16 UIU/ML (ref 0.36–3.74)
VENTRICULAR RATE, ECG03: 118 BPM
WBC # BLD AUTO: 1.8 K/UL (ref 4.3–11.1)

## 2017-10-23 PROCEDURE — 80048 BASIC METABOLIC PNL TOTAL CA: CPT | Performed by: INTERNAL MEDICINE

## 2017-10-23 PROCEDURE — 74011250636 HC RX REV CODE- 250/636: Performed by: HOSPITALIST

## 2017-10-23 PROCEDURE — 80074 ACUTE HEPATITIS PANEL: CPT | Performed by: INTERNAL MEDICINE

## 2017-10-23 PROCEDURE — 85025 COMPLETE CBC W/AUTO DIFF WBC: CPT | Performed by: HOSPITALIST

## 2017-10-23 PROCEDURE — 84153 ASSAY OF PSA TOTAL: CPT | Performed by: HOSPITALIST

## 2017-10-23 PROCEDURE — 36415 COLL VENOUS BLD VENIPUNCTURE: CPT | Performed by: INTERNAL MEDICINE

## 2017-10-23 PROCEDURE — 85610 PROTHROMBIN TIME: CPT | Performed by: HOSPITALIST

## 2017-10-23 PROCEDURE — 83735 ASSAY OF MAGNESIUM: CPT | Performed by: HOSPITALIST

## 2017-10-23 PROCEDURE — 83735 ASSAY OF MAGNESIUM: CPT | Performed by: INTERNAL MEDICINE

## 2017-10-23 PROCEDURE — 84100 ASSAY OF PHOSPHORUS: CPT | Performed by: INTERNAL MEDICINE

## 2017-10-23 PROCEDURE — 80053 COMPREHEN METABOLIC PANEL: CPT | Performed by: HOSPITALIST

## 2017-10-23 PROCEDURE — 85046 RETICYTE/HGB CONCENTRATE: CPT | Performed by: HOSPITALIST

## 2017-10-23 PROCEDURE — 87536 HIV-1 QUANT&REVRSE TRNSCRPJ: CPT | Performed by: INTERNAL MEDICINE

## 2017-10-23 PROCEDURE — 86664 EPSTEIN-BARR NUCLEAR ANTIGEN: CPT | Performed by: INTERNAL MEDICINE

## 2017-10-23 PROCEDURE — 86644 CMV ANTIBODY: CPT | Performed by: INTERNAL MEDICINE

## 2017-10-23 PROCEDURE — 76705 ECHO EXAM OF ABDOMEN: CPT

## 2017-10-23 PROCEDURE — 74011250637 HC RX REV CODE- 250/637: Performed by: HOSPITALIST

## 2017-10-23 PROCEDURE — 87491 CHLMYD TRACH DNA AMP PROBE: CPT | Performed by: EMERGENCY MEDICINE

## 2017-10-23 PROCEDURE — 84443 ASSAY THYROID STIM HORMONE: CPT | Performed by: HOSPITALIST

## 2017-10-23 PROCEDURE — 83605 ASSAY OF LACTIC ACID: CPT | Performed by: HOSPITALIST

## 2017-10-23 PROCEDURE — 65270000029 HC RM PRIVATE

## 2017-10-23 PROCEDURE — 87045 FECES CULTURE AEROBIC BACT: CPT | Performed by: HOSPITALIST

## 2017-10-23 PROCEDURE — 82550 ASSAY OF CK (CPK): CPT | Performed by: HOSPITALIST

## 2017-10-23 PROCEDURE — 74011000250 HC RX REV CODE- 250: Performed by: HOSPITALIST

## 2017-10-23 PROCEDURE — 74011000258 HC RX REV CODE- 258: Performed by: HOSPITALIST

## 2017-10-23 RX ORDER — SODIUM,POTASSIUM PHOSPHATES 280-250MG
1 POWDER IN PACKET (EA) ORAL 4 TIMES DAILY
Status: COMPLETED | OUTPATIENT
Start: 2017-10-23 | End: 2017-10-24

## 2017-10-23 RX ORDER — VANCOMYCIN/0.9 % SOD CHLORIDE 1.5G/250ML
1500 PLASTIC BAG, INJECTION (ML) INTRAVENOUS EVERY 12 HOURS
Status: DISCONTINUED | OUTPATIENT
Start: 2017-10-23 | End: 2017-10-23

## 2017-10-23 RX ADMIN — FAMOTIDINE 20 MG: 10 INJECTION, SOLUTION INTRAVENOUS at 21:32

## 2017-10-23 RX ADMIN — Medication 10 ML: at 14:00

## 2017-10-23 RX ADMIN — ACETAMINOPHEN 650 MG: 325 TABLET ORAL at 11:18

## 2017-10-23 RX ADMIN — Medication 10 ML: at 21:38

## 2017-10-23 RX ADMIN — ONDANSETRON 4 MG: 2 INJECTION INTRAMUSCULAR; INTRAVENOUS at 08:20

## 2017-10-23 RX ADMIN — CEFEPIME HYDROCHLORIDE 1 G: 1 INJECTION, POWDER, FOR SOLUTION INTRAMUSCULAR; INTRAVENOUS at 01:54

## 2017-10-23 RX ADMIN — VANCOMYCIN HYDROCHLORIDE 1500 MG: 10 INJECTION, POWDER, LYOPHILIZED, FOR SOLUTION INTRAVENOUS at 09:20

## 2017-10-23 RX ADMIN — SODIUM CHLORIDE 125 ML/HR: 900 INJECTION, SOLUTION INTRAVENOUS at 16:47

## 2017-10-23 RX ADMIN — CEFEPIME HYDROCHLORIDE 1 G: 1 INJECTION, POWDER, FOR SOLUTION INTRAMUSCULAR; INTRAVENOUS at 09:19

## 2017-10-23 RX ADMIN — SODIUM CHLORIDE 125 ML/HR: 900 INJECTION, SOLUTION INTRAVENOUS at 07:50

## 2017-10-23 RX ADMIN — FAMOTIDINE 20 MG: 10 INJECTION, SOLUTION INTRAVENOUS at 07:51

## 2017-10-23 RX ADMIN — POTASSIUM & SODIUM PHOSPHATES POWDER PACK 280-160-250 MG 1 PACKET: 280-160-250 PACK at 22:14

## 2017-10-23 NOTE — ED TRIAGE NOTES
'Its the same thing I was seen for yesterday but it isnt getting better and the fever is coming back.   I feel weak and I have nausea\"

## 2017-10-23 NOTE — CONSULTS
Infectious Disease Consult    Today's Date: 10/23/2017   Admit Date: 10/22/2017    Impression:   · Acute febrile illness  · Transaminitis    This does not look like a bacterial infection and is much more likely to be viral in nature. A GI virus, acute CMV, EBV, HIV, hepatitis viruses perhaps. Plan:   ·  Discontinue current antibiotics  · Check HIV serum PCR  · EBV ab panel  · CMV Igm/IgG  · Acute hepatitis b and c panel    Anti-infectives:     Inpat Anti-Infectives     Start     Ordered Stop    10/24/17 0900  vanc trough reminder  Other,   ONCE      10/23/17 1028 10/24/17 2059    10/23/17 2100  cefepime (MAXIPIME) 1 g in 0.9% sodium chloride (MBP/ADV) 50 mL  1 g,   IntraVENous,   EVERY 12 HOURS      10/23/17 1025 --    10/23/17 1000  vancomycin (VANCOCIN) 1500 mg in  ml infusion  1,500 mg,   IntraVENous,   EVERY 12 HOURS      10/23/17 0207 --          Subjective:   Date of Consultation:  October 23, 2017  Referring Physician: Rita Ana Rosa    Patient is a 43 y.o. homosexual male with a history of anxiety and depression and taking alprazolam who presented to the ED yesterday with nausea, vomiting, fever, and LLQ abdominal pain x 1 week. Symptoms started > 1 week ago with LLQ abdominal pain. This has gradually subsided but was followed shortly after with nausea/vomiting, fever, and loss of appetite. He has not had diarrhea, and came to the ED the day prior with obstipation. HIV screen negative. He denies dyspnea, cough, dysuria, myalgias, chest pain, oral lesions or odynophagia. He was found to have leukopenia (1.8), thrombocytopenia (57), and transaminases 2-4 x normal.  Procalcitonin was <0.1 and CD was elevated >2000. His CXR was normal and CT abd/pelvis had some lymphadenopathy at the rectosigmoid junction. HIV (-), Monospot (-), Influenza (-).       Patient Active Problem List   Diagnosis Code    Sepsis ([address] A41.9    Rhabdomyolysis M62.82    BERTIN (acute kidney injury) ([address] N17.9    Leukopenia D72.819  Thrombocytopenia (HCC) D69.6    Elevated liver enzymes R74.8    Anxiety F41.9     Past Medical History:   Diagnosis Date    Psychiatric disorder       History reviewed. No pertinent family history. Social History   Substance Use Topics    Smoking status: Current Every Day Smoker    Smokeless tobacco: Never Used    Alcohol use No     History reviewed. No pertinent surgical history. Prior to Admission medications    Medication Sig Start Date End Date Taking? Authorizing Provider   ibuprofen (ADVIL) 200 mg tablet Take 200 mg by mouth. Indications: Fever   Yes Historical Provider   ALPRAZolam Corie Puff) 1 mg tablet Take 1 mg by mouth three (3) times daily as needed for Anxiety. Yes Phys Other, MD   escitalopram oxalate (LEXAPRO) 10 mg tablet Take 10 mg by mouth daily. Phys Other, MD   ondansetron (ZOFRAN ODT) 4 mg disintegrating tablet Take 1 Tab by mouth every twelve (12) hours as needed for Nausea for up to 3 days. 10/21/17 10/24/17  Damon Steger Cheryle Ing., MD       No Known Allergies     Review of Systems:  A comprehensive review of systems was negative except for that written in the History of Present Illness. Objective:     Visit Vitals    BP 97/63    Pulse 86    Temp 98.9 °F (37.2 °C)    Resp 14    Ht 5' 11\" (1.803 m)    Wt 95.3 kg (210 lb)    SpO2 95%    BMI 29.29 kg/m2     Temp (24hrs), Av.7 °F (37.6 °C), Min:97.9 °F (36.6 °C), Max:102.6 °F (39.2 °C)       Lines:  Peripheral IV:            Physical Exam:    General:  Alert, cooperative, well noursished, well developed, appears stated age   Eyes:  Sclera anicteric. Pupils equally round and reactive to light. Mouth/Throat: Mucous membranes normal, oral pharynx clear   Neck: Supple   Lungs:   Clear to auscultation bilaterally, good effort   CV:  Regular rate and rhythm,no murmur, click, rub or gallop   Abdomen:   Soft, non-tender.  bowel sounds normal. non-distended   Extremities: No cyanosis or edema   Skin: Skin color, texture, turgor normal. no acute rash or lesions   Lymph nodes: Cervical and supraclavicular normal   Musculoskeletal: No swelling or deformity   Lines/Devices:  Intact, no erythema, drainage or tenderness   Psych: Alert and oriented, normal mood affect given the setting         Data Review:     CBC:Recent Labs      10/23/17   1107  10/22/17   2015  10/21/17   1738   WBC  1.8*  2.2*  3.1*   GRANS  58  67  61   MONOS  6  9  10   EOS  0*  0*  0*   ANEU  1.0*  1.5*  1.9   ABL  0.6  0.5  0.9   HGB  12.5*  13.9  15.0   HCT  37.2*  40.0*  43.2   PLT  57*  65*  99*       BMP:  Recent Labs      10/23/17   1107  10/22/17   2015  10/21/17   1738   CREA  0.97  1.17  1.06   BUN  4*  6  13   NA  141  135*  133*   K  4.0  3.8  3.5   CL  107  100  98   CO2  29  25  24   AGAP  5*  10  11   GLU  95  124*  101*       LFTS:  Recent Labs      10/23/17   1107  10/22/17   2015  10/21/17   1738   TBILI  0.4  0.4  0.5   ALT  72*  78*  62   SGOT  138*  133*  73*   AP  52  60  68   TP  6.0*  7.0  7.8   ALB  3.0*  3.6  3.9       Microbiology:     All Micro Results     Procedure Component Value Units Date/Time    C. DIFFICILE/EPI PCR [[id number]]     Order Status:  Sent Specimen:  Stool     CULTURE, BLOOD [[id number]] Collected:  10/22/17 2015    Order Status:  Completed Specimen:  Blood from Blood Updated:  10/23/17 0929     Special Requests: RIGHT HAND        Culture result: NO GROWTH AFTER 12 HOURS       CULTURE, BLOOD [[id number]] Collected:  10/22/17 2025    Order Status:  Completed Specimen:  Blood from Blood Updated:  10/23/17 0929     Special Requests: LEFT ANTECUBITAL        Culture result: NO GROWTH AFTER 12 HOURS       CULTURE, URINE [[id number]] Collected:  10/22/17 2252    Order Status:  Completed Specimen:  Urine from Clean catch Updated:  10/23/17 0839     Special Requests: NO SPECIAL REQUESTS        Culture result:         NO GROWTH AFTER SHORT PERIOD OF INCUBATION. FURTHER RESULTS TO FOLLOW AFTER OVERNIGHT INCUBATION.     CULTURE, STOOL [[id number]]     Order Status:  Sent Specimen:  Stool     INFLUENZA A & B AG (RAPID TEST) [[id number]] Collected:  10/22/17 2030    Order Status:  Completed Specimen:  Nasopharyngeal from Nasal washing Updated:  10/22/17 2052     Influenza A Ag NEGATIVE          NEGATIVE FOR THE PRESENCE OF INFLUENZA A ANTIGEN  INFECTION DUE TO INFLUENZA A CANNOT BE RULED OUT. BECAUSE THE ANTIGEN PRESENT IN THE SAMPLE MAY BE BELOW  THE DETECTION LIMIT OF THE TEST. A NEGATIVE TEST IS PRESUMPTIVE AND IT IS RECOMMENDED THAT THESE RESULTS BE CONFIRMED BY VIRAL CULTURE OR AN FDA-CLEARED INFLUENZA A AND B MOLECULAR ASSAY. Influenza B Ag NEGATIVE          NEGATIVE FOR THE PRESENCE OF INFLUENZA B ANTIGEN  INFECTION DUE TO INFLUENZA B CANNOT BE RULED OUT. BECAUSE THE ANTIGEN PRESENT IN THE SAMPLE MAY BE BELOW  THE DETECTION LIMIT OF THE TEST. A NEGATIVE TEST IS PRESUMPTIVE AND IT IS RECOMMENDED THAT THESE RESULTS BE CONFIRMED BY VIRAL CULTURE OR AN FDA-CLEARED INFLUENZA A AND B MOLECULAR ASSAY. Imaging:   Abd ultrasound (10/22/17): IMPRESSION: Unremarkable exam    CXR (10/22/17): IMPRESSION: Normal chest    CT abd/pelv (10/21/17): IMPRESSION:  Multiple small lymph nodes in the rectosigmoid junction region. Recommend checking the patient's PSA level. Recommend elective colonoscopy. No definite focal colon mass is identified. Minimal bibasilar atelectasis.     Signed By: Sweta Bailey MD     October 23, 2017

## 2017-10-23 NOTE — PROGRESS NOTES
Progress Note    Patient: Susanne Blanco MRN: [id number]  SSN: [id number]    YOB: 1980  Age: 43 y.o. Sex: male      Admit Date: 10/22/2017    LOS: 1 day     Subjective:     Pt febrile, and extremely deconditioned this am.  Pt very warm to touch. Pt also c/o persistent diarrhea. Pt denies HIV risk factors \"plus they checked me HIV this weekend, and it was neg. \"  Pt also c/o of some transient ABD pain now resolved. Objective:     Vitals:    10/23/17 0340 10/23/17 0715 10/23/17 1111 10/23/17 1122   BP: 102/62 105/60 100/66    Pulse: 88 (!) 101 98    Resp: 16 16 18    Temp: 97.9 °F (36.6 °C) 98 °F (36.7 °C) (!) 102.6 °F (39.2 °C) (!) 101 °F (38.3 °C)   SpO2: 96% 95% 95%    Weight:       Height:            Intake and Output:  Current Shift: 10/23 0701 - 10/23 1900  In: -   Out: 400 [Urine:400]  Last three shifts: 10/21 1901 - 10/23 0700  In: 471 [I.V.:471]  Out: 900 [Urine:900]    Physical Exam:   GENERAL: fatigued, cooperative, mild distress, appears stated age, toxic, pale, flushed  LUNG: clear to auscultation bilaterally  HEART: tachy  ABDOMEN: soft, non-tender. Bowel sounds normal. No masses,  no organomegaly  SKIN: warm to touch and moist  NEUROLOGIC: AOx3. Gait normal. Reflexes and motor strength normal and symmetric. Cranial nerves 2-12 and sensation grossly intact.   PSYCHIATRIC: non focal    Lab/Data Review:  CMP:   Lab Results   Component Value Date/Time     10/23/2017 11:07 AM    K 4.0 10/23/2017 11:07 AM     10/23/2017 11:07 AM    CO2 29 10/23/2017 11:07 AM    AGAP 5 (L) 10/23/2017 11:07 AM    GLU 95 10/23/2017 11:07 AM    BUN 4 (L) 10/23/2017 11:07 AM    CREA 0.97 10/23/2017 11:07 AM    GFRAA >60 10/23/2017 11:07 AM    GFRNA >60 10/23/2017 11:07 AM    CA 7.5 (L) 10/23/2017 11:07 AM    MG 1.9 10/23/2017 11:07 AM    ALB 3.0 (L) 10/23/2017 11:07 AM    TP 6.0 (L) 10/23/2017 11:07 AM    GLOB 3.0 10/23/2017 11:07 AM    AGRAT 1.0 (L) 10/23/2017 11:07 AM    SGOT 138 (H) 10/23/2017 11:07 AM    ALT 72 (H) 10/23/2017 11:07 AM     CBC:   Lab Results   Component Value Date/Time    WBC 1.8 (LL) 10/23/2017 11:07 AM    HGB 12.5 (L) 10/23/2017 11:07 AM    HCT 37.2 (L) 10/23/2017 11:07 AM    PLT 57 (L) 10/23/2017 11:07 AM     Recent Glucose Results:   Lab Results   Component Value Date/Time    GLU 95 10/23/2017 11:07 AM     (H) 10/22/2017 08:15 PM     COAGS:   Lab Results   Component Value Date/Time    PTP 11.6 10/23/2017 11:07 AM    INR 1.1 10/23/2017 11:07 AM     10/21/17 CT SCAN  ABD/PELVIS  IMPRESSION:  Multiple small lymph nodes in the rectosigmoid junction region. Recommend  checking the patient's PSA level. Recommend elective colonoscopy. No definite  focal colon mass is identified.     Minimal bibasilar atelectasis. 10/22/17 PCXR  IMPRESSION: Normal chest.     RUQ U/S: 10/23/2017  CLINICAL INFORMATION: Abdominal pain, nausea, fever     Multiple sonographic images of the right upper quadrant were obtained. Liver is  normal in size and echogenicity measuring 16.2 cm in height. No intrahepatic  bile duct distention. Common bile duct is normal, 4 mm. Gallbladder normal size  and wall thickness. No gallstones. There is Doppler flow in the portal vein. Pancreas was poorly visualized because of gas. Abdominal aorta was not  visualized. Right kidney is normal size and echogenicity, 11.2 cm in height. No  hydronephrosis. IVC patent.     BCx x 2 10/21/17: NGTD  BCx x 2 10/22/17: NGTD  UCx       10/22/17: NGTD       Assessment:     Principal Problem:    Sepsis ([facility] (10/22/2017)    Active Problems:    Rhabdomyolysis (10/22/2017)      BERTIN (acute kidney injury) ([facility] (10/22/2017)      Leukopenia (10/22/2017)      Thrombocytopenia ([facility] (10/22/2017)      Elevated liver enzymes (10/22/2017)      Anxiety (10/22/2017)        Plan:     Sepsis/Febrile Illness unknown etiology  · Pt still extremely febrile with Neg Blood/Urine Cx,  Radiographic test show rectalsigmoid  LAD, so concerned pt may suffer from an intraabdominal infection. C/S ID and GI for workup advise. · 10/22  stool Cx  Pending  · 10/23 Order C Diff  · Concerned about leukopenia in the face of febrile illness, pt admit to Neg HIV screen over weekend. [address] 10/23 approximately 1044, so no current indication for neutropenic precautions   · Thrombocytopenia most likely due to sepsis. Muscle Breakdown   · 10/22 CPK over 2000, imply some muscle breakdown due to febrile illness. Recheck CPK 1024 in am  · mIVF  cc/hr    BERTIN  · mIVF    Elevated tranaminases  · Most like due to shock liver, will follow up on hepatitis panel, and administer mIVF. PPX  · SCD'd due to thrombocytopenia. Code status   · Full    FEN  · 10/23 NPO overnight, but will adv to clear 10/24  · Correct electrolytes PRN    Dispo  · Not till febrile illness ameliorated; pt will remain in-house for at least 23 more hrs.           Signed By: Ulysses Boudreaux MD     October 23, 2017

## 2017-10-23 NOTE — H&P
HOSPITALIST H&P/CONSULT  NAME:  Reymundo Leung   Age:  43 y.o.  :   1980   DOS:               10/22/17  MRN:   [id number]  PCP: None  Consulting MD:  Treatment Team: Attending Provider: Fina Hayes MD; Primary Nurse: Melody Ren    HPI:   Mr. Luma Daugherty is a 44 yo M with PMHx of Anxiety and depression (on chronic Xanax ) who complains of approx 1 week of nausea, gastric content vomiting, fever and LLQ abdominal pain. Seen yesterday in ED, at that time he also complained of constipation, although had some soft BM and he left AMA. His abdominal p[ain improved, although he continued to have fevers 101.9F and increased generalized malaise, reson why he returned to ED. Mr. Luma Daugherty states that all of his symptoms started 1 week prior after he had sexual intercourse. Denies any SOB, CP or dysuria. HIV screening 10/21 negative. WBC:2.1, from 3.1 on a day prior. Pls 65 from 99. Cr:1.17, AST:133, ALT: 78, Procal <0.1, CK:2397. CXR w/o acute pathology. CT abdomen/pelvis 10/21 with multiple lymph nodes in rectosigmoid junction. Denies any alcohol use. Using some THC 1-2 weeks prior. Smoker. 10+ point ROS done and is negative except as noted in HPI. Past Medical History:   Diagnosis Date    Psychiatric disorder       History reviewed. No pertinent surgical history. Prior to Admission Medications   Prescriptions Last Dose Informant Patient Reported? Taking? ALPRAZolam (XANAX) 1 mg tablet   Yes No   Sig: Take 1 mg by mouth three (3) times daily as needed for Anxiety. escitalopram oxalate (LEXAPRO) 10 mg tablet   Yes No   Sig: Take 10 mg by mouth daily. ondansetron (ZOFRAN ODT) 4 mg disintegrating tablet   No No   Sig: Take 1 Tab by mouth every twelve (12) hours as needed for Nausea for up to 3 days. Facility-Administered Medications: None     Home meds reconciled.   No Known Allergies   Social History   Substance Use Topics    Smoking status: Current Every Day Smoker    Smokeless tobacco: Never Used    Alcohol use No      History reviewed. No pertinent family history. I personally reviewed home medications, social and family history. There is no immunization history on file for this patient. Objective:     Patient Vitals for the past 24 hrs:   Temp Pulse Resp BP SpO2   10/22/17 2117 (!) 100.9 °F (38.3 °C) - - - -   10/22/17 2023 - (!) 122 18 132/75 92 %   10/22/17 2002 (!) 101.5 °F (38.6 °C) (!) 129 16 107/77 96 %     Temp (24hrs), Av.2 °F (38.4 °C), Min:100.9 °F (38.3 °C), Max:101.5 °F (38.6 °C)    Oxygen Therapy  O2 Sat (%): 92 % (10/22/17 2023)  Pulse via Oximetry: 123 beats per minute (10/22/17 2023)  O2 Device: Room air (10/22/17 2002)  Oxygen Therapy  O2 Sat (%): 92 % (10/22/17 2023)  Pulse via Oximetry: 123 beats per minute (10/22/17 2023)  O2 Device: Room air (10/22/17 2002)    Physical Exam:  General:         Alert, cooperative, no acute distress. Febrile 100.5F  HEENT:               NCAT. No obvious deformity. Nares normal. No drainage  Lungs:                      CTABL. No wheezing/rhonchi/rales  Cardiovascular:   RRR. No m/r/g. No pedal edema b/l. +2 PT/DT pulses b/l. Abdomen:       S/nt/nd. Bowel sounds normal. .Rectal exam with some perirectal abrations. Nontender prostate . Amanda TATIANA Negron RN. Skin:         No rashes or lesions. Not Jaundiced  Neurologic:    AAOx3. No gross focal deficit. Moves all extremities. Normal sensory. Normal gait. Psychiatric:         Good mood. Normal affect. Denies any SI/HI.        Data Review:   Recent Results (from the past 24 hour(s))   CBC WITH AUTOMATED DIFF    Collection Time: 10/22/17  8:15 PM   Result Value Ref Range    WBC 2.2 (L) 4.3 - 11.1 K/uL    RBC 4.39 4.23 - 5.67 M/uL    HGB 13.9 13.6 - 17.2 g/dL    HCT 40.0 (L) 41.1 - 50.3 %    MCV 91.1 79.6 - 97.8 FL    MCH 31.7 26.1 - 32.9 PG    MCHC 34.8 31.4 - 35.0 g/dL    RDW 13.6 11.9 - 14.6 %    PLATELET 65 (L) 927 - 450 K/uL    MPV 12.3 10.8 - 14.1 FL    DF AUTOMATED NEUTROPHILS 67 43 - 78 %    LYMPHOCYTES 23 13 - 44 %    MONOCYTES 9 4.0 - 12.0 %    EOSINOPHILS 0 (L) 0.5 - 7.8 %    BASOPHILS 1 0.0 - 2.0 %    IMMATURE GRANULOCYTES 0 0.0 - 5.0 %    ABS. NEUTROPHILS 1.5 (L) 1.7 - 8.2 K/UL    ABS. LYMPHOCYTES 0.5 0.5 - 4.6 K/UL    ABS. MONOCYTES 0.2 0.1 - 1.3 K/UL    ABS. EOSINOPHILS 0.0 0.0 - 0.8 K/UL    ABS. BASOPHILS 0.0 0.0 - 0.2 K/UL    ABS. IMM. GRANS. 0.0 0.0 - 0.5 K/UL   METABOLIC PANEL, COMPREHENSIVE    Collection Time: 10/22/17  8:15 PM   Result Value Ref Range    Sodium 135 (L) 136 - 145 mmol/L    Potassium 3.8 3.5 - 5.1 mmol/L    Chloride 100 98 - 107 mmol/L    CO2 25 21 - 32 mmol/L    Anion gap 10 7 - 16 mmol/L    Glucose 124 (H) 65 - 100 mg/dL    BUN 6 6 - 23 MG/DL    Creatinine 1.17 0.8 - 1.5 MG/DL    GFR est AA >60 >60 ml/min/1.73m2    GFR est non-AA >60 >60 ml/min/1.73m2    Calcium 7.9 (L) 8.3 - 10.4 MG/DL    Bilirubin, total 0.4 0.2 - 1.1 MG/DL    ALT (SGPT) 78 (H) 12 - 65 U/L    AST (SGOT) 133 (H) 15 - 37 U/L    Alk.  phosphatase 60 50 - 136 U/L    Protein, total 7.0 6.3 - 8.2 g/dL    Albumin 3.6 3.5 - 5.0 g/dL    Globulin 3.4 2.3 - 3.5 g/dL    A-G Ratio 1.1 (L) 1.2 - 3.5     CULTURE, BLOOD    Collection Time: 10/22/17  8:15 PM   Result Value Ref Range    Special Requests: RIGHT HAND      Culture result: PENDING    CK    Collection Time: 10/22/17  8:15 PM   Result Value Ref Range    CK 2397 (H) 21 - 215 U/L   PROCALCITONIN    Collection Time: 10/22/17  8:15 PM   Result Value Ref Range    Procalcitonin <0.1 ng/mL   MAGNESIUM    Collection Time: 10/22/17  8:15 PM   Result Value Ref Range    Magnesium 1.8 1.8 - 2.4 mg/dL   POC LACTIC ACID    Collection Time: 10/22/17  8:17 PM   Result Value Ref Range    Lactic Acid (POC) 3.1 (H) 0.5 - 1.9 mmol/L   INFLUENZA A & B AG (RAPID TEST)    Collection Time: 10/22/17  8:30 PM   Result Value Ref Range    Influenza A Ag NEGATIVE  NEG      Influenza B Ag NEGATIVE  NEG       All Micro Results     Procedure Component Value Units Date/Time    CULTURE, URINE [[id number]]     Order Status:  Sent Specimen:  Urine from Clean catch     INFLUENZA A & B AG (RAPID TEST) [[id number]] Collected:  10/22/17 2030    Order Status:  Completed Specimen:  Nasopharyngeal from Nasal washing Updated:  10/22/17 2052     Influenza A Ag NEGATIVE          NEGATIVE FOR THE PRESENCE OF INFLUENZA A ANTIGEN  INFECTION DUE TO INFLUENZA A CANNOT BE RULED OUT. BECAUSE THE ANTIGEN PRESENT IN THE SAMPLE MAY BE BELOW  THE DETECTION LIMIT OF THE TEST. A NEGATIVE TEST IS PRESUMPTIVE AND IT IS RECOMMENDED THAT THESE RESULTS BE CONFIRMED BY VIRAL CULTURE OR AN FDA-CLEARED INFLUENZA A AND B MOLECULAR ASSAY. Influenza B Ag NEGATIVE          NEGATIVE FOR THE PRESENCE OF INFLUENZA B ANTIGEN  INFECTION DUE TO INFLUENZA B CANNOT BE RULED OUT. BECAUSE THE ANTIGEN PRESENT IN THE SAMPLE MAY BE BELOW  THE DETECTION LIMIT OF THE TEST. A NEGATIVE TEST IS PRESUMPTIVE AND IT IS RECOMMENDED THAT THESE RESULTS BE CONFIRMED BY VIRAL CULTURE OR AN FDA-CLEARED INFLUENZA A AND B MOLECULAR ASSAY. CULTURE, BLOOD [[id number]] Collected:  10/22/17 2025    Order Status:  Completed Specimen:  Blood from Blood Updated:  10/22/17 2035    CULTURE, BLOOD [[id number]] Collected:  10/22/17 2015    Order Status:  Completed Specimen:  Blood from Blood Updated:  10/22/17 2029     Special Requests: RIGHT HAND        Culture result: PENDING          Imaging Daksha Keep /Studies:  I personally reviewed all labs, imaging, and other studies this admission:  CXR Results  (Last 48 hours)               10/22/17 2019  XR CHEST PORT Final result    Impression:  IMPRESSION: Normal chest.               Narrative:  EXAM:  XR CHEST PORT       INDICATION:  Weakness and fatigue       COMPARISON:  None. FINDINGS: A portable AP radiograph of the chest was obtained at 2029 hours. The   patient is on a cardiac monitor. The lungs are clear.   The cardiac and   mediastinal contours and pulmonary vascularity are normal.  The bones and soft   tissues are grossly within normal limits. CT Results  (Last 48 hours)               10/21/17 2039  CT ABD PELV W CONT Final result    Impression:  IMPRESSION:       Multiple small lymph nodes in the rectosigmoid junction region. Recommend   checking the patient's PSA level. Recommend elective colonoscopy. No definite   focal colon mass is identified. Minimal bibasilar atelectasis. Date of Dictation: 10/21/2017 8:50 PM               Narrative:  CT ABDOMEN AND PELVIS WITH CONTRAST       HISTORY: Left lower quadrant pain       COMPARISON: None       TECHNIQUE: Helical imaging was performed from the lung bases through the ischial   tuberosities during the intravenous infusion of 100 cc of Isovue-370. Oral   contrast was administered. Coronal and sagittal reformats were performed. Dose reduction techniques used: Automated exposure control, adjustment of the   mAs and/or kVp according to patient's size, and iterative reconstruction   techniques. FINDINGS:   *  LUNG BASES: Minimal bibasilar atelectasis. *  LIVER: Within normal limits. *  GALLBLADDER AND BILE DUCTS: Normal.   *  SPLEEN: Within normal limits. *  URINARY TRACT: Within normal limits. *  ADRENALS: Within normal limits. *  PANCREAS: Within normal limits. *  GASTROINTESTINAL TRACT: Multiple deep pelvic lymph nodes in the region of the   rectosigmoid junction. The largest measures 1.2 x 0.6 cm. No focal colonic mass. *  RETROPERITONEUM: Within normal limits. *  PERITONEAL CAVITY AND ABDOMINAL WALL: Within normal limits. *  PELVIS: Prostate gland is normal in size. *  SPINE / BONES: Within normal limits. *  OTHER COMMENTS: None. Assessment and Plan:      Active Hospital Problems    Diagnosis Date Noted    Sepsis ([address] 10/22/2017    Rhabdomyolysis 10/22/2017    BERTIN (acute kidney injury) ([facility] [address] 10/22/2017    Leukopenia 10/22/2017    Thrombocytopenia ([facility] 10/22/2017    Elevated liver enzymes 10/22/2017    Anxiety 10/22/2017       PLAN  - BX 2/2 negative at 24h. Repeat BC pending.  - started on Zosyn/Vanc in ED. Will continue with Cefepime/Vanc until [facility] results available because of pancytopenia. - procal <01. Check ESR, CRP - likely viral etiology?  - check stool studies  - IV hydration  - follow renal function, CK  - get acute hepatitis panel, acetaminophen level, GGT, alcohol level, drug screen, mono screen  - follow liver function, get RUQ US, PT/INR. Consider GI input if not improving  - resume home Xanax  - get RPR, PPD, GC/Chlamydia, PSA  - will need colonoscopy, likely outpatient  - PRN pain medications  - nicotine patch  -SW - needs PCP      DVT ppx: SCDs  Code status:  Full CODE  Estimated LOS:  2-3 days  Risk assessment:  High. Plan of care discussed with: patient. Care team.  Mr. Maribeth Silva will need at least 2 midnights of admission.       Rafi Mauricio MD  10/22/17

## 2017-10-23 NOTE — PROGRESS NOTES
TRANSFER - IN REPORT:    Verbal report received from Jose Armando Quintero RN(name) on Yakov Brock  being received from ED(unit) for routine progression of care      Report consisted of patients Situation, Background, Assessment and   Recommendations(SBAR). Information from the following report(s) SBAR, ED Summary, Intake/Output, MAR and Recent Results was reviewed with the receiving nurse. Opportunity for questions and clarification was provided. Assessment to be completed upon patients arrival to unit and care assumed.

## 2017-10-23 NOTE — ED NOTES
TRANSFER - OUT REPORT:    Verbal report given to Parmjit Marquez RN on [facility]  being transferred to 2nd Floor for routine progression of care       Report consisted of patients Situation, Background, Assessment and   Recommendations(SBAR). Information from the following report(s) SBAR was reviewed with the receiving nurse. Lines:   Peripheral IV 10/22/17 Right Hand (Active)        Opportunity for questions and clarification was provided.       Patient transported with:   [employer]

## 2017-10-23 NOTE — PROGRESS NOTES
END OF SHIFT NOTE:    INTAKE/OUTPUT  10/22 0701 - 10/23 0700  In: 471 [I.V.:471]  Out: 900 [Urine:900]  Voiding: YES  Catheter: NO  Drain:              Flatus: Patient does have flatus present. Stool:  0 occurrences. Characteristics:       Emesis: 0 occurrences. Characteristics:        VITAL SIGNS  Patient Vitals for the past 12 hrs:   Temp Pulse Resp BP SpO2   10/23/17 0340 97.9 °F (36.6 °C) 88 16 102/62 96 %   10/22/17 2305 98.2 °F (36.8 °C) 100 16 106/64 93 %   10/22/17 2217 99.1 °F (37.3 °C) 94 16 113/55 93 %   10/22/17 2117 (!) 100.9 °F (38.3 °C) - - - -   10/22/17 2023 - (!) 122 18 132/75 92 %   10/22/17 2002 (!) 101.5 °F (38.6 °C) (!) 129 16 107/77 96 %       Pain Assessment  Pain Intensity 1: 0 (10/22/17 2330)  Pain Location 1: Abdomen     Patient Stated Pain Goal: 0    Ambulating  Yes    Shift report given to oncoming nurse at the bedside.     Maricel Meadows RN

## 2017-10-23 NOTE — CONSULTS
[facility] Consult Note         Referring Physician:  Dr Nicole Frederick Date:  10/23/2017    Admit Date:  10/22/2017    Chief Complaint:  ABD Pain, N/V    Subjective:     History of Present Illness:  Patient is a 43 y.o. male with PMH of anxiety who is seen in consultation at the request of Dr. Juleen Fabry for ABD Pain, N/V. The patient reports abd pain beginning 1 week ago that resolved on Friday. He then began having N/V and fever. WBC is low at 1.8 as well as plt at 57. Elevated AST and ALT with rest of LFT's/t bili normal.  US negative. Multiple small lymph nodes in the rectosigmoid junction region on CT. He denies diarrhea. HIV non reactive on 10/21. Hep panel and G&C pending. RPR and mono negative on 10/22 as well as flu. He reports one episode of small vol BRRB after constipation episode. Denies frequent constipation. Denies fm hx of GI malignancy. Grandfather had prostate ca and PSA is 8.3. He denies any ETOH use but does smoke pot on occasion. Right upper quadrant ultrasound 10/23/17  Multiple sonographic images of the right upper quadrant were obtained. Liver is  normal in size and echogenicity measuring 16.2 cm in height. No intrahepatic  bile duct distention. Common bile duct is normal, 4 mm. Gallbladder normal size  and wall thickness. No gallstones. There is Doppler flow in the portal vein. Pancreas was poorly visualized because of gas. Abdominal aorta was not  visualized. Right kidney is normal size and echogenicity, 11.2 cm in height. No  hydronephrosis. IVC patent. IMPRESSION: Unremarkable exam    CT ABD/P 10/21/17:  *  LUNG BASES: Minimal bibasilar atelectasis. *  LIVER: Within normal limits. *  GALLBLADDER AND BILE DUCTS: Normal.  *  SPLEEN: Within normal limits. *  URINARY TRACT: Within normal limits. *  ADRENALS: Within normal limits. *  PANCREAS: Within normal limits.   *  GASTROINTESTINAL TRACT: Multiple deep pelvic lymph nodes in the region of the  rectosigmoid junction. The largest measures 1.2 x 0.6 cm. No focal colonic mass. *  RETROPERITONEUM: Within normal limits. *  PERITONEAL CAVITY AND ABDOMINAL WALL: Within normal limits. *  PELVIS: Prostate gland is normal in size. *  SPINE / BONES: Within normal limits. *  OTHER COMMENTS: None. IMPRESSION:   Multiple small lymph nodes in the rectosigmoid junction region. Recommend  checking the patient's PSA level. Recommend elective colonoscopy. No definite  focal colon mass is identified.   Minimal bibasilar atelectasis. PMH:  Past Medical History:   Diagnosis Date    Psychiatric disorder        PSH:  History reviewed. No pertinent surgical history. Allergies:  No Known Allergies    Home Medications:  Prior to Admission medications    Medication Sig Start Date End Date Taking? Authorizing Provider   ibuprofen (ADVIL) 200 mg tablet Take 200 mg by mouth. Indications: Fever   Yes Historical Provider   ALPRAZolam Ed Jetty) 1 mg tablet Take 1 mg by mouth three (3) times daily as needed for Anxiety. Yes Phys Other, MD   escitalopram oxalate (LEXAPRO) 10 mg tablet Take 10 mg by mouth daily. Phys Other, MD   ondansetron (ZOFRAN ODT) 4 mg disintegrating tablet Take 1 Tab by mouth every twelve (12) hours as needed for Nausea for up to 3 days.  10/21/17 10/24/17  Matt [address] [place] [address] MD Chiquita       [facility] Medications:  Current Facility-Administered Medications   Medication Dose Route Frequency    influenza vaccine 2017-18 (3 yrs+)(PF) (FLUZONE QUAD/FLUARIX QUAD) injection 0.5 mL  0.5 mL IntraMUSCular PRIOR TO DISCHARGE    vancomycin (VANCOCIN) 1500 mg in  ml infusion  1,500 mg IntraVENous Q12H    cefepime (MAXIPIME) 1 g in 0.9% sodium chloride (MBP/ADV) 50 mL  1 g IntraVENous Q12H    [START ON 10/24/2017] vanc trough reminder   Other ONCE    sodium chloride (NS) flush 5-10 mL  5-10 mL IntraVENous PRN    0.9% sodium chloride infusion  125 mL/hr IntraVENous CONTINUOUS    ALPRAZolam Ed Jetty) tablet 1 mg  1 mg Oral TID PRN    sodium chloride (NS) flush 5-10 mL  5-10 mL IntraVENous Q8H    sodium chloride (NS) flush 5-10 mL  5-10 mL IntraVENous PRN    tuberculin injection 5 Units  5 Units IntraDERMal ONCE    morphine injection 2 mg  2 mg IntraVENous Q4H PRN    nicotine (NICODERM CQ) 14 mg/24 hr patch 1 Patch  1 Patch TransDERmal Q24H    famotidine (PF) (PEPCID) 20 mg in sodium chloride 0.9 % 10 mL injection  20 mg IntraVENous Q12H    ondansetron (ZOFRAN) injection 4 mg  4 mg IntraVENous Q6H PRN    oxyCODONE IR (ROXICODONE) tablet 5 mg  5 mg Oral Q6H PRN    acetaminophen (TYLENOL) tablet 650 mg  650 mg Oral Q6H PRN       Social History:  Social History   Substance Use Topics    Smoking status: Current Every Day Smoker    Smokeless tobacco: Never Used    Alcohol use No       Pt denies any history of drug use, blood transfusions, or tattoos. Family History:  History reviewed. No pertinent family history. Review of Systems:  A detailed 10 system ROS is obtained, with pertinent positives as listed above. All others are negative. Diet:  Clears    Objective:     Physical Exam:  Vitals:  Visit Vitals    /66    Pulse 98    Temp 99.3 °F (37.4 °C)    Resp 18    Ht 5' 11\" (1.803 m)    Wt 95.3 kg (210 lb)    SpO2 95%    BMI 29.29 kg/m2     Gen:  Pt is alert, cooperative, no acute distress  Skin:  Extremities and face reveal no rashes. HEENT: Sclerae anicteric. Extra-occular muscles are intact. No oral ulcers. No abnormal pigmentation of the lips. The neck is supple. Cardiovascular: Regular rate and rhythm. No murmurs, gallops, or rubs. Respiratory:  Comfortable breathing with no accessory muscle use. Clear breath sounds anteriorly with no wheezes, rales, or rhonchi. GI:  Abdomen nondistended, soft, and nontender. Normal active bowel sounds. No enlargement of the liver or spleen. No masses palpable. Rectal:  Deferred  Musculoskeletal:  No pitting edema of the lower legs. Neurological:  Gross memory appears intact. Patient is alert and oriented. Psychiatric:  Mood appears appropriate with judgement intact. Lymphatic:  No cervical or supraclavicular adenopathy. Laboratory:    Recent Labs      10/23/17   1107  10/22/17   2015  10/21/17   1738   WBC  1.8*  2.2*  3.1*   HGB  12.5*  13.9  15.0   HCT  37.2*  40.0*  43.2   PLT  57*  65*  99*   MCV  93.0  91.1  90.8   NA  141  135*  133*   K  4.0  3.8  3.5   CL  107  100  98   CO2  29  25  24   BUN  4*  6  13   CREA  0.97  1.17  1.06   CA  7.5*  7.9*  8.6   MG  1.9  1.8   --    GLU  95  124*  101*   AP  52  60  68   SGOT  138*  133*  73*   ALT  72*  78*  62   TBILI  0.4  0.4  0.5   ALB  3.0*  3.6  3.9   TP  6.0*  7.0  7.8   LPSE   --    --   239   PTP  11.6   --    --    INR  1.1   --    --           Assessment:     Principal Problem:    Sepsis ([email or web address] (10/22/2017)    Active Problems:    Rhabdomyolysis (10/22/2017)      BERTIN (acute kidney injury) ([facility] (10/22/2017)      Leukopenia (10/22/2017)      Thrombocytopenia ([facility] (10/22/2017)      Elevated liver enzymes (10/22/2017)      Anxiety (10/22/2017)      43 y.o. male with PMH of anxiety who is seen in consultation at the request of Dr. Cassidy Lynch for ABD Pain, N/V with elevated AST/ALT, low WBC and plt. US neg for liver ds. Multiple small lymph nodes in the rectosigmoid junction region on CT. He denies diarrhea. HIV non reactive on 10/21. Hep panel and G&C pending. RPR and mono negative on 10/22 as well as flu. He reports one episode of small vol BRRB after constipation episode. Denies frequent constipation. Denies fm hx of GI malignancy. Grandfather had prostate ca and PSA is 8.3. Plan:     1) ID cs and rec pending for fever of unknown origin  2) Consider colo to further eval rectosigmoid lymph nodes? 3) Follow up hep panel and viral testing    Barry Weiner, NP  Patient is seen and examined in collaboration with Dr. Dick Barriga. Assessment and plan as per Dr. Drew Rivas.

## 2017-10-23 NOTE — ED PROVIDER NOTES
HPI Comments: Presents with complaint of fever and nausea. Patient was seen yesterday for fever and abdominal pain. He states he had a bowel movement and his abdominal pain is improved and his vomiting has also improved  With nausea medicine that he developed a fever this evening and his nausea came back worse so he came back. Patient left AMA yesterday. He's had on and off fever and nausea for the past 4-5 days. He denies any dysuria, shortness of breath. He is a smoker but denies alcohol or IV drug use    Patient is a 43 y.o. male presenting with fever and abdominal pain. The history is provided by the patient. Fever    This is a new problem. The current episode started more than 2 days ago. The problem occurs daily. The problem has been gradually worsening. The maximum temperature noted was 101 - 101.9 F. The temperature was taken using an oral thermometer. Associated symptoms include vomiting and muscle aches. Pertinent negatives include no sore throat, no cough, no shortness of breath, no rash and no urinary symptoms. He has tried acetaminophen (zofran) for the symptoms. The treatment provided no relief. Abdominal Pain    Associated symptoms include a fever and vomiting. Past Medical History:   Diagnosis Date    Psychiatric disorder        History reviewed. No pertinent surgical history. History reviewed. No pertinent family history. Social History     Social History    Marital status: SINGLE     Spouse name: N/A    Number of children: N/A    Years of education: N/A     Occupational History    Not on file. Social History Main Topics    Smoking status: Current Every Day Smoker    Smokeless tobacco: Never Used    Alcohol use No    Drug use: No    Sexual activity: Not on file     Other Topics Concern    Not on file     Social History Narrative         ALLERGIES: Review of patient's allergies indicates no known allergies.     Review of Systems   Constitutional: Positive for chills and fever. HENT: Negative for sore throat. Respiratory: Negative for cough and shortness of breath. Gastrointestinal: Positive for abdominal pain and vomiting. Skin: Negative for rash and wound. All other systems reviewed and are negative. Vitals:    10/22/17 2002 10/22/17 2023 10/22/17 2117   BP: 107/77 132/75    Pulse: (!) 129 (!) 122    Resp: 16 18    Temp: (!) 101.5 °F (38.6 °C)  (!) 100.9 °F (38.3 °C)   SpO2: 96% 92%    Weight: 95.3 kg (210 lb)     Height: 5' 11\" (1.803 m)              Physical Exam   Constitutional: He is oriented to person, place, and time. He appears well-developed and well-nourished. No distress. HENT:   Head: Normocephalic and atraumatic. Neck: Normal range of motion. Neck supple. Cardiovascular: Normal rate and regular rhythm. No murmur heard. Pulmonary/Chest: Effort normal and breath sounds normal. No respiratory distress. He has no wheezes. He has no rales. Abdominal: Soft. He exhibits no distension. There is no tenderness. There is no rebound and no guarding. Musculoskeletal: Normal range of motion. He exhibits no edema or tenderness. Neurological: He is alert and oriented to person, place, and time. No cranial nerve deficit. Coordination normal.   Skin: Skin is warm and dry. No rash noted. He is not diaphoretic. No erythema. Psychiatric: He has a normal mood and affect. His behavior is normal.   Nursing note and vitals reviewed. MDM  Number of Diagnoses or Management Options  Hepatitis:   Leukopenia, unspecified type:   Non-traumatic rhabdomyolysis:   Sepsis, due to unspecified organism [facility]): Thrombocytopenia [facility]):   Diagnosis management comments: Patient's CT yesterday showed lymphadenopathy but no mass or obstruction. I reviewed the CT myself tonight. His chest x-ray is negative. His liver enzymes and white count and platelet count are all more abnormal.  His lactic is also significantly elevated compared to yesterday.   HEENT yesterday was negative. His blood cultures showed no growth to date. He is given 30 mL per KG of IV fluids Tylenol IV abx zofran and I sent the acute hepatitis panel. Patient is agreeable to staying today and the hospitalist was called.        Amount and/or Complexity of Data Reviewed  Clinical lab tests: ordered and reviewed  Review and summarize past medical records: yes  Discuss the patient with other providers: yes  Independent visualization of images, tracings, or specimens: yes (Sinus tach, no ST changes)    Risk of Complications, Morbidity, and/or Mortality  Presenting problems: high  Diagnostic procedures: high  Management options: high    Patient Progress  Patient progress: stable    ED Course       Procedures

## 2017-10-23 NOTE — PROGRESS NOTES
Pharmacokinetic Consult to Pharmacist    Jayshree Lira is a 43 y.o. male being treated for sepsis with vancomycin and cefepime. Height: 5' 11\" (180.3 cm)  Weight: 95.3 kg (210 lb)  Lab Results   Component Value Date/Time    BUN 6 10/22/2017 08:15 PM    Creatinine 1.17 10/22/2017 08:15 PM    WBC 2.2 10/22/2017 08:15 PM    Procalcitonin <0.1 10/22/2017 08:15 PM    Lactic acid 1.0 10/22/2017 10:19 PM    Lactic Acid (POC) 3.1 10/22/2017 08:17 PM      Estimated Creatinine Clearance: 100.9 mL/min (based on Cr of 1.17). All Micro Results     Procedure Component Value Units Date/Time    CULTURE, URINE [[id number]] Collected:  10/22/17 2252    Order Status:  Completed Specimen:  Urine from Clean catch Updated:  10/23/17 0007    CULTURE, STOOL [[id number]]     Order Status:  Sent Specimen:  Stool     INFLUENZA A & B AG (RAPID TEST) [[id number]] Collected:  10/22/17 2030    Order Status:  Completed Specimen:  Nasopharyngeal from Nasal washing Updated:  10/22/17 2052     Influenza A Ag NEGATIVE          NEGATIVE FOR THE PRESENCE OF INFLUENZA A ANTIGEN  INFECTION DUE TO INFLUENZA A CANNOT BE RULED OUT. BECAUSE THE ANTIGEN PRESENT IN THE SAMPLE MAY BE BELOW  THE DETECTION LIMIT OF THE TEST. A NEGATIVE TEST IS PRESUMPTIVE AND IT IS RECOMMENDED THAT THESE RESULTS BE CONFIRMED BY VIRAL CULTURE OR AN FDA-CLEARED INFLUENZA A AND B MOLECULAR ASSAY. Influenza B Ag NEGATIVE          NEGATIVE FOR THE PRESENCE OF INFLUENZA B ANTIGEN  INFECTION DUE TO INFLUENZA B CANNOT BE RULED OUT. BECAUSE THE ANTIGEN PRESENT IN THE SAMPLE MAY BE BELOW  THE DETECTION LIMIT OF THE TEST. A NEGATIVE TEST IS PRESUMPTIVE AND IT IS RECOMMENDED THAT THESE RESULTS BE CONFIRMED BY VIRAL CULTURE OR AN FDA-CLEARED INFLUENZA A AND B MOLECULAR ASSAY.          CULTURE, BLOOD [[id number]] Collected:  10/22/17 2025    Order Status:  Completed Specimen:  Blood from Blood Updated:  10/22/17 2035    CULTURE, BLOOD [[id number]] Collected:  10/22/17 2015    Order Status:  Completed Specimen:  Blood from Blood Updated:  10/22/17 2029     Special Requests: RIGHT HAND        Culture result: PENDING          Day 1 of vancomycin. Goal trough is 15-20. Vancomycin dose initiated at 2 g x 1 dose, followed by 1.5 g q12h. Will continue to follow patient.       Thank you,  Micheal Echeverria, PharmD

## 2017-10-23 NOTE — PROGRESS NOTES
Primary Nurse Gretchen Tamayo RN and Damion Moyer RN performed a dual skin assessment on this patient No impairment noted. Pt oriented to room, call light within reach.

## 2017-10-24 LAB
ANION GAP SERPL CALC-SCNC: 9 MMOL/L (ref 7–16)
BUN SERPL-MCNC: 2 MG/DL (ref 6–23)
CALCIUM SERPL-MCNC: 7.7 MG/DL (ref 8.3–10.4)
CHLORIDE SERPL-SCNC: 109 MMOL/L (ref 98–107)
CK SERPL-CCNC: 1811 U/L (ref 21–215)
CO2 SERPL-SCNC: 25 MMOL/L (ref 21–32)
CREAT SERPL-MCNC: 0.71 MG/DL (ref 0.8–1.5)
ERYTHROCYTE [DISTWIDTH] IN BLOOD BY AUTOMATED COUNT: 14.2 % (ref 11.9–14.6)
GLUCOSE SERPL-MCNC: 94 MG/DL (ref 65–100)
HAV IGM SERPL QL IA: NEGATIVE
HBV CORE IGM SERPL QL IA: NEGATIVE
HBV SURFACE AG SERPL QL IA: NEGATIVE
HCT VFR BLD AUTO: 35.9 % (ref 41.1–50.3)
HCV AB S/CO SERPL IA: <0.1 S/CO RATIO (ref 0–0.9)
HGB BLD-MCNC: 12 G/DL (ref 13.6–17.2)
MAGNESIUM SERPL-MCNC: 2.1 MG/DL (ref 1.8–2.4)
MCH RBC QN AUTO: 31.1 PG (ref 26.1–32.9)
MCHC RBC AUTO-ENTMCNC: 33.4 G/DL (ref 31.4–35)
MCV RBC AUTO: 93 FL (ref 79.6–97.8)
MM INDURATION POC: 0 MM (ref 0–5)
PHOSPHATE SERPL-MCNC: 2.8 MG/DL (ref 2.5–4.5)
PLATELET # BLD AUTO: 53 K/UL (ref 150–450)
PMV BLD AUTO: 12.4 FL (ref 10.8–14.1)
POTASSIUM SERPL-SCNC: 3.8 MMOL/L (ref 3.5–5.1)
PPD POC: NORMAL NEGATIVE
PROCALCITONIN SERPL-MCNC: <0.1 NG/ML
RBC # BLD AUTO: 3.86 M/UL (ref 4.23–5.67)
SODIUM SERPL-SCNC: 143 MMOL/L (ref 136–145)
WBC # BLD AUTO: 1.8 K/UL (ref 4.3–11.1)

## 2017-10-24 PROCEDURE — 74011250636 HC RX REV CODE- 250/636: Performed by: HOSPITALIST

## 2017-10-24 PROCEDURE — 82550 ASSAY OF CK (CPK): CPT | Performed by: INTERNAL MEDICINE

## 2017-10-24 PROCEDURE — 80048 BASIC METABOLIC PNL TOTAL CA: CPT | Performed by: INTERNAL MEDICINE

## 2017-10-24 PROCEDURE — 36415 COLL VENOUS BLD VENIPUNCTURE: CPT | Performed by: INTERNAL MEDICINE

## 2017-10-24 PROCEDURE — 84100 ASSAY OF PHOSPHORUS: CPT | Performed by: INTERNAL MEDICINE

## 2017-10-24 PROCEDURE — 85027 COMPLETE CBC AUTOMATED: CPT | Performed by: INTERNAL MEDICINE

## 2017-10-24 PROCEDURE — 74011250637 HC RX REV CODE- 250/637: Performed by: HOSPITALIST

## 2017-10-24 PROCEDURE — 74011000250 HC RX REV CODE- 250: Performed by: HOSPITALIST

## 2017-10-24 PROCEDURE — 83735 ASSAY OF MAGNESIUM: CPT | Performed by: INTERNAL MEDICINE

## 2017-10-24 PROCEDURE — 84145 PROCALCITONIN (PCT): CPT | Performed by: INTERNAL MEDICINE

## 2017-10-24 PROCEDURE — 65270000029 HC RM PRIVATE

## 2017-10-24 RX ADMIN — SODIUM CHLORIDE 125 ML/HR: 900 INJECTION, SOLUTION INTRAVENOUS at 12:42

## 2017-10-24 RX ADMIN — SODIUM CHLORIDE 125 ML/HR: 900 INJECTION, SOLUTION INTRAVENOUS at 22:15

## 2017-10-24 RX ADMIN — POTASSIUM & SODIUM PHOSPHATES POWDER PACK 280-160-250 MG 1 PACKET: 280-160-250 PACK at 08:15

## 2017-10-24 RX ADMIN — POTASSIUM & SODIUM PHOSPHATES POWDER PACK 280-160-250 MG 1 PACKET: 280-160-250 PACK at 12:40

## 2017-10-24 RX ADMIN — FAMOTIDINE 20 MG: 10 INJECTION, SOLUTION INTRAVENOUS at 22:10

## 2017-10-24 RX ADMIN — POTASSIUM & SODIUM PHOSPHATES POWDER PACK 280-160-250 MG 1 PACKET: 280-160-250 PACK at 16:59

## 2017-10-24 RX ADMIN — SODIUM CHLORIDE 125 ML/HR: 900 INJECTION, SOLUTION INTRAVENOUS at 08:16

## 2017-10-24 RX ADMIN — ACETAMINOPHEN 650 MG: 325 TABLET ORAL at 00:46

## 2017-10-24 RX ADMIN — SODIUM CHLORIDE 125 ML/HR: 900 INJECTION, SOLUTION INTRAVENOUS at 00:49

## 2017-10-24 RX ADMIN — FAMOTIDINE 20 MG: 10 INJECTION, SOLUTION INTRAVENOUS at 08:15

## 2017-10-24 RX ADMIN — Medication 10 ML: at 14:00

## 2017-10-24 NOTE — PROGRESS NOTES
Hospitalist Progress Note        Subjective:   Daily Progress Note: 10/24/2017 1:11 PM    Hx: 36M with anxiety and depression on chronic benzo came with 1 week of Nausea, vomiting, fever and LLQ abdominal pain. Patient was initially in the ER but left AMA. He return when fever and malaise persisted. Abdominal pain had resolved. On presentation he was Leukopenic 2.1, Thrombocytopenic 99, elevated transaminases and CPK. CXR w/o acute pathology. CT abdomen/pelvis 10/21 with multiple lymph nodes in rectosigmoid junction. 10/24: Patient reported that he is feeling better. No diarrhea, reported small amount of blood with bowel movement this morning. Tolerating clear liquids. No other GI complaints      Review of Systems  -10 systems reviewed and are negative other than stated in subjective data above    Objective:     Visit Vitals    BP 95/62    Pulse 94    Temp 98.7 °F (37.1 °C)    Resp 18    Ht 5' 11\" (1.803 m)    Wt 95.5 kg (210 lb 8 oz)    SpO2 95%    BMI 29.36 kg/m2      O2 Device: Room air    Temp (24hrs), Av °F (37.2 °C), Min:98.3 °F (36.8 °C), Max:100.3 °F (37.9 °C)      10/24 07 - 10/24 1900  In: 448 [I.V.:424]  Out: 800 [Urine:800]  10/22 1901 - 10/24 0700  In: 2809 [I.V.:3468]  Out: 3764 [Urine:4475]    General appearance: alert, cooperative, no distress, appears stated age, not toxic appearing  Neck: supple  Lungs: clear to auscultation bilaterally  Heart: regular rate and rhythm, S1, S2 normal  Abdomen: soft, non-tender.  Bowel sounds normal. No masses,  no organomegaly  Extremities: extremities normal, atraumatic, no cyanosis or edema  Skin: Skin color, texture, turgor normal. No rashes or lesions  Neurologic: Grossly normal  Psych: AAOx3, normal mood and affect, insight intact        Data Review    Recent Results (from the past 24 hour(s))   METABOLIC PANEL, BASIC    Collection Time: 10/23/17  7:56 PM   Result Value Ref Range    Sodium 142 136 - 145 mmol/L    Potassium 3.6 3.5 - 5.1 mmol/L    Chloride 109 (H) 98 - 107 mmol/L    CO2 26 21 - 32 mmol/L    Anion gap 7 7 - 16 mmol/L    Glucose 115 (H) 65 - 100 mg/dL    BUN 3 (L) 6 - 23 MG/DL    Creatinine 0.85 0.8 - 1.5 MG/DL    GFR est AA >60 >60 ml/min/1.73m2    GFR est non-AA >60 >60 ml/min/1.73m2    Calcium 7.4 (L) 8.3 - 10.4 MG/DL   PHOSPHORUS    Collection Time: 10/23/17  7:56 PM   Result Value Ref Range    Phosphorus 1.3 (L) 2.5 - 4.5 MG/DL   MAGNESIUM    Collection Time: 10/23/17  7:56 PM   Result Value Ref Range    Magnesium 2.0 1.8 - 2.4 mg/dL   CULTURE, STOOL    Collection Time: 10/23/17 10:42 PM   Result Value Ref Range    Special Requests: NO SPECIAL REQUESTS      Culture result:        NO GROWTH AFTER SHORT PERIOD OF INCUBATION. FURTHER RESULTS TO FOLLOW AFTER OVERNIGHT INCUBATION.    PLEASE READ & DOCUMENT PPD TEST IN 24 HRS    Collection Time: 10/23/17 11:48 PM   Result Value Ref Range    PPD  Negative    mm Induration 0 mm   METABOLIC PANEL, BASIC    Collection Time: 10/24/17  5:45 AM   Result Value Ref Range    Sodium 143 136 - 145 mmol/L    Potassium 3.8 3.5 - 5.1 mmol/L    Chloride 109 (H) 98 - 107 mmol/L    CO2 25 21 - 32 mmol/L    Anion gap 9 7 - 16 mmol/L    Glucose 94 65 - 100 mg/dL    BUN 2 (L) 6 - 23 MG/DL    Creatinine 0.71 (L) 0.8 - 1.5 MG/DL    GFR est AA >60 >60 ml/min/1.73m2    GFR est non-AA >60 >60 ml/min/1.73m2    Calcium 7.7 (L) 8.3 - 10.4 MG/DL   PHOSPHORUS    Collection Time: 10/24/17  5:45 AM   Result Value Ref Range    Phosphorus 2.8 2.5 - 4.5 MG/DL   MAGNESIUM    Collection Time: 10/24/17  5:45 AM   Result Value Ref Range    Magnesium 2.1 1.8 - 2.4 mg/dL   CBC W/O DIFF    Collection Time: 10/24/17  5:45 AM   Result Value Ref Range    WBC 1.8 (LL) 4.3 - 11.1 K/uL    RBC 3.86 (L) 4.23 - 5.67 M/uL    HGB 12.0 (L) 13.6 - 17.2 g/dL    HCT 35.9 (L) 41.1 - 50.3 %    MCV 93.0 79.6 - 97.8 FL    MCH 31.1 26.1 - 32.9 PG    MCHC 33.4 31.4 - 35.0 g/dL    RDW 14.2 11.9 - 14.6 %    PLATELET 53 (L) 060 - 450 K/uL    MPV 12.4 10.8 - 14.1 FL   CK    Collection Time: 10/24/17  5:45 AM   Result Value Ref Range    CK 1811 (H) 21 - 215 U/L   PROCALCITONIN    Collection Time: 10/24/17  5:45 AM   Result Value Ref Range    Procalcitonin <0.1 ng/mL         Assessment/Plan:     Principal Problem:    Sepsis ([address] (10/22/2017)    Active Problems:    Rhabdomyolysis (10/22/2017)      BERTIN (acute kidney injury) ([address] (10/22/2017)      Leukopenia (10/22/2017)      Thrombocytopenia ([address] (10/22/2017)      Elevated liver enzymes (10/22/2017)      Anxiety (10/22/2017)          Plan:   Sepsis/Febrile illness without definitive Etiology, however suspected to be viral  Tmax in last 24hrs: 102.6F  ID following and has sent out CMV, HIV PCR, viral hepatitis panel, which are all pending  GI has been consulted since CT abdo/pelvis showed LAD in rectosigmoid area. No endoscopic intervention for now. - consider advancing diet    Elevated CPK  - Trending down 1800 today  - encourage fluid intake, cont IVF      Leukopenia ( without absolute neutropenia) and thrombocytopenia  - suspected to be apart of febrile illness    Elevated transaminases   - hep panel is pending.   - could be a component of acute illness.     DVT Prophylaxis: SCDS      Signed By: Carley Caruso MD     October 24, 2017

## 2017-10-24 NOTE — PROGRESS NOTES
GI DAILY PROGRESS NOTE    Admit Date:  10/22/2017    Today's Date:  10/24/2017    CC: Abd pain, n/v    Subjective:     Patient is feeling better today with no further abdominal pain, nausea, or vomiting. He has not had diarrhea, having a formed BM today with small amount of BRB on the stool and with wiping. Medications:   Current Facility-Administered Medications   Medication Dose Route Frequency    influenza vaccine 2017-18 (3 yrs+)(PF) (FLUZONE QUAD/FLUARIX QUAD) injection 0.5 mL  0.5 mL IntraMUSCular PRIOR TO DISCHARGE    potassium, sodium phosphates (NEUTRA-PHOS) packet 1 Packet  1 Packet Oral QID    sodium chloride (NS) flush 5-10 mL  5-10 mL IntraVENous PRN    0.9% sodium chloride infusion  125 mL/hr IntraVENous CONTINUOUS    ALPRAZolam (XANAX) tablet 1 mg  1 mg Oral TID PRN    sodium chloride (NS) flush 5-10 mL  5-10 mL IntraVENous Q8H    sodium chloride (NS) flush 5-10 mL  5-10 mL IntraVENous PRN    morphine injection 2 mg  2 mg IntraVENous Q4H PRN    nicotine (NICODERM CQ) 14 mg/24 hr patch 1 Patch  1 Patch TransDERmal Q24H    famotidine (PF) (PEPCID) 20 mg in sodium chloride 0.9 % 10 mL injection  20 mg IntraVENous Q12H    ondansetron (ZOFRAN) injection 4 mg  4 mg IntraVENous Q6H PRN    oxyCODONE IR (ROXICODONE) tablet 5 mg  5 mg Oral Q6H PRN    acetaminophen (TYLENOL) tablet 650 mg  650 mg Oral Q6H PRN       Review of Systems:  ROS was obtained, with pertinent positives as listed above. No chest pain or SOB.     Diet:  Clear liquids    Objective:   Vitals:  Visit Vitals    BP 96/61    Pulse 80    Temp 98.8 °F (37.1 °C)    Resp 15    Ht 5' 11\" (1.803 m)    Wt 95.5 kg (210 lb 8 oz)    SpO2 95%    BMI 29.36 kg/m2     Intake/Output:  10/24 0701 - 10/24 1900  In: 370 [I.V.:424]  Out: 400 [Urine:400]  10/22 1901 - 10/24 0700  In: 5769 [I.V.:3468]  Out: 8067 [Urine:4475]  Exam:  General appearance: alert, cooperative, no distress, lying in bed  Lungs: clear to auscultation bilaterally anteriorly  Heart: regular rate and rhythm  Abdomen: soft, non-tender. Bowel sounds normal. No masses, no organomegaly  Neuro:  alert and oriented    Data Review (Labs):    Recent Labs      10/24/17   0545  10/23/17   1956  10/23/17   1107  10/22/17   2015  10/21/17   1738   WBC  1.8*   --   1.8*  2.2*  3.1*   HGB  12.0*   --   12.5*  13.9  15.0   HCT  35.9*   --   37.2*  40.0*  43.2   PLT  53*   --   57*  65*  99*   MCV  93.0   --   93.0  91.1  90.8   NA  143  142  141  135*  133*   K  3.8  3.6  4.0  3.8  3.5   CL  109*  109*  107  100  98   CO2  25  26  29  25  24   BUN  2*  3*  4*  6  13   CREA  0.71*  0.85  0.97  1.17  1.06   CA  7.7*  7.4*  7.5*  7.9*  8.6   MG  2.1  2.0  1.9  1.8   --    GLU  94  115*  95  124*  101*   AP   --    --   52  60  68   SGOT   --    --   138*  133*  73*   ALT   --    --   72*  78*  62   TBILI   --    --   0.4  0.4  0.5   ALB   --    --   3.0*  3.6  3.9   TP   --    --   6.0*  7.0  7.8   LPSE   --    --    --    --   239   PTP   --    --   11.6   --    --    INR   --    --   1.1   --    --       Ref. Range 10/21/2017 17:38 10/22/2017 20:15 10/22/2017 22:08 10/22/2017 22:19 10/23/2017 11:07 10/24/2017 05:45   Lactic acid Latest Ref Range: 0.4 - 2.0 MMOL/L    1.0 0.8    Procalcitonin Latest Units: ng/mL <0.1 <0.1    <0.1   GGT Latest Ref Range: 15 - 85 U/L   46      CK Latest Ref Range: 21 - 215 U/L  2397 (H)   2560 (H) 1811 (H)   Troponin-I, Qt. Latest Ref Range: 0.02 - 0.05 NG/ML   0.02      C-Reactive protein Latest Ref Range: 0.0 - 0.9 mg/dL   <0.3         Ref. Range 10/22/2017 22:08   Acetaminophen level Latest Ref Range: 10.0 - 30.0 ug/mL 15      Ref.  Range 10/22/2017 22:52   AMPHETAMINES Latest Units:   NEGATIVE   BARBITURATES Latest Units:   NEGATIVE   BENZODIAZEPINES Latest Units:   POSITIVE   COCAINE Latest Units:   NEGATIVE   METHADONE Latest Units:   NEGATIVE   OPIATES Latest Units:   NEGATIVE   PCP(PHENCYCLIDINE) Latest Units:   NEGATIVE   THC (TH-CANNABINOL) Latest Units: POSITIVE      Ref. Range 10/23/2017 11:07   TSH Latest Ref Range: 0.358 - 3.740 uIU/mL 2.160     CT ABDOMEN AND PELVIS WITH CONTRAST 21 Oct 2017   HISTORY: Left lower quadrant pain   COMPARISON: None   TECHNIQUE: Helical imaging was performed from the lung bases through the ischial  tuberosities during the intravenous infusion of 100 cc of Isovue-370. Oral  contrast was administered. Coronal and sagittal reformats were performed.   Dose reduction techniques used: Automated exposure control, adjustment of the  mAs and/or kVp according to patient's size, and iterative reconstruction  techniques.   FINDINGS:  *  LUNG BASES: Minimal bibasilar atelectasis. *  LIVER: Within normal limits. *  GALLBLADDER AND BILE DUCTS: Normal.  *  SPLEEN: Within normal limits. *  URINARY TRACT: Within normal limits. *  ADRENALS: Within normal limits. *  PANCREAS: Within normal limits. *  GASTROINTESTINAL TRACT: Multiple deep pelvic lymph nodes in the region of the  rectosigmoid junction. The largest measures 1.2 x 0.6 cm. No focal colonic mass. *  RETROPERITONEUM: Within normal limits. *  PERITONEAL CAVITY AND ABDOMINAL WALL: Within normal limits. *  PELVIS: Prostate gland is normal in size. *  SPINE / BONES: Within normal limits. *  OTHER COMMENTS: None.   IMPRESSION  IMPRESSION:   Multiple small lymph nodes in the rectosigmoid junction region. Recommend  checking the patient's PSA level. Recommend elective colonoscopy. No definite  focal colon mass is identified.   Minimal bibasilar atelectasis.   Date of Dictation: 10/21/2017 8:50 PM    Right upper quadrant ultrasound Exam date: 10/23/2017   CLINICAL INFORMATION: Abdominal pain, nausea, fever   Multiple sonographic images of the right upper quadrant were obtained. Liver is  normal in size and echogenicity measuring 16.2 cm in height. No intrahepatic  bile duct distention. Common bile duct is normal, 4 mm. Gallbladder normal size  and wall thickness. No gallstones.  There is Doppler flow in the portal vein. Pancreas was poorly visualized because of gas. Abdominal aorta was not  visualized. Right kidney is normal size and echogenicity, 11.2 cm in height. No  hydronephrosis. IVC patent.   IMPRESSION  IMPRESSION: Unremarkable exam    Hepatitis panel, EBV, CMV pending    Assessment:     Principal Problem:    Sepsis ([facility] [address] (10/22/2017)    Active Problems:    Rhabdomyolysis (10/22/2017)      BERTIN (acute kidney injury) ([facility] [address] (10/22/2017)      Leukopenia (10/22/2017)      Thrombocytopenia ([address] (10/22/2017)      Elevated liver enzymes (10/22/2017)      Anxiety (10/22/2017)    44 yo male with PMH of anxiety, who was seen in consultation 23 Oct 2017 at the request of Dr. Kandace Bautista for ABD Pain, N/V with elevated AST/ALT, low WBC and plt. US neg for liver ds. Multiple small lymph nodes in the rectosigmoid junction region on CT. HIV non reactive on 10/21. RPR and mono negative on 10/22 as well as flu. Hep panel, CMV, EBV, and G&C pending. He has not had diarrhea and the abdominal pain, nausea, and vomiting have improved. Tmax 102.6 yesterday - unclear etiology. He did have a small amount of rectal bleeding with BM. Plan:     -Appreciate ID evaluation.  -Supportive care. -Follow up hep panel and viral testing.  -Clear liquids for now in case colonoscopy is needed.  -Follow. Patient is seen and examined in collaboration with Dr. Sadie Fry. Assessment and plan as per Dr. Lina Washington. [facility]

## 2017-10-24 NOTE — PROGRESS NOTES
Infectious Disease Progress Note    Today's Date: 10/24/2017   Admit Date: 10/22/2017    Impression:   · Acute febrile illness  · Transaminitis  · Elevated CK trending down    Plan:   · Continue to observe off abx  · HIV serum PCR,EBV ab panel, CMV Igm/IgG pending  · Acute hepatitis b and c panel  pending    Anti-infectives:     Inpat Anti-Infectives     Start     Ordered Stop    10/24/17 0900  vanc trough reminder  Other,   ONCE      10/23/17 1028 10/24/17 2059    10/23/17 2100  cefepime (MAXIPIME) 1 g in 0.9% sodium chloride (MBP/ADV) 50 mL  1 g,   IntraVENous,   EVERY 12 HOURS      10/23/17 1025 --    10/23/17 1000  vancomycin (VANCOCIN) 1500 mg in  ml infusion  1,500 mg,   IntraVENous,   EVERY 12 HOURS      10/23/17 0207 --          Subjective:   Date of Consultation:  2017  Referring Physician: Christin Rubalcava  Feeling much improved now. Appetite has returned; keeping food down. No fever spikes. No diarrhea. No Known Allergies     Review of Systems:  A comprehensive review of systems was negative except for that written in the History of Present Illness. Objective:     Visit Vitals    BP 96/61    Pulse 80    Temp 98.8 °F (37.1 °C)    Resp 15    Ht 5' 11\" (1.803 m)    Wt 95.5 kg (210 lb 8 oz)    SpO2 95%    BMI 29.36 kg/m2     Temp (24hrs), Av.7 °F (37.6 °C), Min:98.3 °F (36.8 °C), Max:102.6 °F (39.2 °C)       Lines:  Peripheral IV:            Physical Exam:    General:  Alert, cooperative, well noursished, well developed, appears stated age   Eyes:  Sclera anicteric. Pupils equally round and reactive to light. Mouth/Throat: Mucous membranes normal, oral pharynx clear   Neck: Supple   Lungs:   Clear to auscultation bilaterally, good effort   CV:  Regular rate and rhythm,no murmur, click, rub or gallop   Abdomen:   Soft, non-tender.  bowel sounds normal. non-distended   Extremities: No cyanosis or edema   Skin: Skin color, texture, turgor normal. no acute rash or lesions   Lymph nodes: Cervical and supraclavicular normal   Musculoskeletal: No swelling or deformity   Lines/Devices:  Intact, no erythema, drainage or tenderness   Psych: Alert and oriented, normal mood affect given the setting         Data Review:     CBC:  Recent Labs      10/24/17   0545  10/23/17   1107  10/22/17   2015   WBC  1.8*  1.8*  2.2*   GRANS   --   58  67   MONOS   --   6  9   EOS   --   0*  0*   ANEU   --   1.0*  1.5*   ABL   --   0.6  0.5   HGB  12.0*  12.5*  13.9   HCT  35.9*  37.2*  40.0*   PLT  53*  57*  65*       BMP:  Recent Labs      10/24/17   0545  10/23/17   1956  10/23/17   1107   CREA  0.71*  0.85  0.97   BUN  2*  3*  4*   NA  143  142  141   K  3.8  3.6  4.0   CL  109*  109*  107   CO2  25  26  29   AGAP  9  7  5*   GLU  94  115*  95       LFTS:  Recent Labs      10/23/17   1107  10/22/17   2015  10/21/17   1738   TBILI  0.4  0.4  0.5   ALT  72*  78*  62   SGOT  138*  133*  73*   AP  52  60  68   TP  6.0*  7.0  7.8   ALB  3.0*  3.6  3.9       Microbiology:     All Micro Results     Procedure Component Value Units Date/Time    CULTURE, URINE [[id number]] Collected:  10/22/17 2252    Order Status:  Completed Specimen:  Urine from Clean catch Updated:  10/24/17 0749     Special Requests: NO SPECIAL REQUESTS        Culture result: NO GROWTH 1 DAY       CULTURE, BLOOD [[id number]] Collected:  10/22/17 2015    Order Status:  Completed Specimen:  Blood from Blood Updated:  10/24/17 0714     Special Requests: RIGHT HAND        Culture result: NO GROWTH 2 DAYS       CULTURE, BLOOD [[id number]] Collected:  10/22/17 2025    Order Status:  Completed Specimen:  Blood from Blood Updated:  10/24/17 0714     Special Requests: LEFT ANTECUBITAL        Culture result: NO GROWTH 2 DAYS       CULTURE, STOOL [[id number]] Collected:  10/23/17 2242    Order Status:  Completed Specimen:  Stool Updated:  10/23/17 2259    C.  DIFFICILE/EPI PCR [[id number]]     Order Status:  Sent Specimen:  Stool     INFLUENZA A & B AG (RAPID TEST) [[id number]] Collected:  10/22/17 2030    Order Status:  Completed Specimen:  Nasopharyngeal from Nasal washing Updated:  10/22/17 2052     Influenza A Ag NEGATIVE          NEGATIVE FOR THE PRESENCE OF INFLUENZA A ANTIGEN  INFECTION DUE TO INFLUENZA A CANNOT BE RULED OUT. BECAUSE THE ANTIGEN PRESENT IN THE SAMPLE MAY BE BELOW  THE DETECTION LIMIT OF THE TEST. A NEGATIVE TEST IS PRESUMPTIVE AND IT IS RECOMMENDED THAT THESE RESULTS BE CONFIRMED BY VIRAL CULTURE OR AN FDA-CLEARED INFLUENZA A AND B MOLECULAR ASSAY. Influenza B Ag NEGATIVE          NEGATIVE FOR THE PRESENCE OF INFLUENZA B ANTIGEN  INFECTION DUE TO INFLUENZA B CANNOT BE RULED OUT. BECAUSE THE ANTIGEN PRESENT IN THE SAMPLE MAY BE BELOW  THE DETECTION LIMIT OF THE TEST. A NEGATIVE TEST IS PRESUMPTIVE AND IT IS RECOMMENDED THAT THESE RESULTS BE CONFIRMED BY VIRAL CULTURE OR AN FDA-CLEARED INFLUENZA A AND B MOLECULAR ASSAY. Imaging:   Abd ultrasound (10/22/17): IMPRESSION: Unremarkable exam    CXR (10/22/17): IMPRESSION: Normal chest    CT abd/pelv (10/21/17): IMPRESSION:  Multiple small lymph nodes in the rectosigmoid junction region. Recommend checking the patient's PSA level. Recommend elective colonoscopy. No definite focal colon mass is identified. Minimal bibasilar atelectasis.     Signed By: Lamont Caba NP     October 24, 2017

## 2017-10-24 NOTE — PROGRESS NOTES
END OF SHIFT NOTE:    INTAKE/OUTPUT  10/23 0701 - 10/24 0700  In: 2997 [I.V.:2997]  Out: 3040 [Urine:3575]  Voiding: YES  Catheter: NO  Drain:              Flatus: Patient does have flatus present. Stool:  1 occurrences. Characteristics:  Stool Assessment  Stool Color: Brown  Stool Appearance: Formed  Stool Amount: Small  Stool Source/Status: Rectum    Emesis: 0 occurrences. Characteristics:        VITAL SIGNS  Patient Vitals for the past 12 hrs:   Temp Pulse Resp BP SpO2   10/24/17 0711 98.8 °F (37.1 °C) 80 15 96/61 95 %   10/24/17 0332 98.3 °F (36.8 °C) 84 18 90/59 94 %   10/24/17 0221 98.5 °F (36.9 °C) - - - -   10/24/17 0000 100.3 °F (37.9 °C) 100 18 91/58 94 %   10/23/17 2000 99.2 °F (37.3 °C) 93 16 91/62 97 %       Pain Assessment  Pain Intensity 1: 0 (10/23/17 1508)  Pain Location 1: Abdomen     Patient Stated Pain Goal: 0    Ambulating  Yes    Shift report given to oncoming nurse at the bedside.     Juan Francisco Crowder RN

## 2017-10-25 VITALS
OXYGEN SATURATION: 97 % | BODY MASS INDEX: 29.82 KG/M2 | TEMPERATURE: 98.4 F | WEIGHT: 213 LBS | RESPIRATION RATE: 16 BRPM | HEIGHT: 71 IN | SYSTOLIC BLOOD PRESSURE: 102 MMHG | HEART RATE: 80 BPM | DIASTOLIC BLOOD PRESSURE: 68 MMHG

## 2017-10-25 PROBLEM — M62.82 RHABDOMYOLYSIS: Status: RESOLVED | Noted: 2017-10-22 | Resolved: 2017-10-25

## 2017-10-25 PROBLEM — N17.9 AKI (ACUTE KIDNEY INJURY) (HCC): Status: RESOLVED | Noted: 2017-10-22 | Resolved: 2017-10-25

## 2017-10-25 PROBLEM — A41.9 SEPSIS (HCC): Status: RESOLVED | Noted: 2017-10-22 | Resolved: 2017-10-25

## 2017-10-25 LAB
ANION GAP SERPL CALC-SCNC: 9 MMOL/L (ref 7–16)
BACTERIA SPEC CULT: NORMAL
BUN SERPL-MCNC: 2 MG/DL (ref 6–23)
C TRACH RRNA SPEC QL NAA+PROBE: NEGATIVE
CALCIUM SERPL-MCNC: 7.8 MG/DL (ref 8.3–10.4)
CHLORIDE SERPL-SCNC: 111 MMOL/L (ref 98–107)
CO2 SERPL-SCNC: 25 MMOL/L (ref 21–32)
CREAT SERPL-MCNC: 0.61 MG/DL (ref 0.8–1.5)
ERYTHROCYTE [DISTWIDTH] IN BLOOD BY AUTOMATED COUNT: 14 % (ref 11.9–14.6)
GLUCOSE SERPL-MCNC: 96 MG/DL (ref 65–100)
HCT VFR BLD AUTO: 36.3 % (ref 41.1–50.3)
HGB BLD-MCNC: 11.8 G/DL (ref 13.6–17.2)
MAGNESIUM SERPL-MCNC: 2.1 MG/DL (ref 1.8–2.4)
MCH RBC QN AUTO: 31.1 PG (ref 26.1–32.9)
MCHC RBC AUTO-ENTMCNC: 32.5 G/DL (ref 31.4–35)
MCV RBC AUTO: 95.5 FL (ref 79.6–97.8)
N GONORRHOEA RRNA SPEC QL NAA+PROBE: NEGATIVE
PHOSPHATE SERPL-MCNC: 2.4 MG/DL (ref 2.5–4.5)
PLATELET # BLD AUTO: 59 K/UL (ref 150–450)
PMV BLD AUTO: 12.9 FL (ref 10.8–14.1)
POTASSIUM SERPL-SCNC: 3.6 MMOL/L (ref 3.5–5.1)
RBC # BLD AUTO: 3.8 M/UL (ref 4.23–5.67)
SERVICE CMNT-IMP: NORMAL
SODIUM SERPL-SCNC: 145 MMOL/L (ref 136–145)
SPECIMEN SOURCE: NORMAL
WBC # BLD AUTO: 2.9 K/UL (ref 4.3–11.1)

## 2017-10-25 PROCEDURE — 83735 ASSAY OF MAGNESIUM: CPT | Performed by: INTERNAL MEDICINE

## 2017-10-25 PROCEDURE — 74011250636 HC RX REV CODE- 250/636: Performed by: HOSPITALIST

## 2017-10-25 PROCEDURE — 80048 BASIC METABOLIC PNL TOTAL CA: CPT | Performed by: INTERNAL MEDICINE

## 2017-10-25 PROCEDURE — 85027 COMPLETE CBC AUTOMATED: CPT | Performed by: INTERNAL MEDICINE

## 2017-10-25 PROCEDURE — 36415 COLL VENOUS BLD VENIPUNCTURE: CPT | Performed by: INTERNAL MEDICINE

## 2017-10-25 PROCEDURE — 84100 ASSAY OF PHOSPHORUS: CPT | Performed by: INTERNAL MEDICINE

## 2017-10-25 PROCEDURE — 74011000250 HC RX REV CODE- 250: Performed by: HOSPITALIST

## 2017-10-25 RX ADMIN — FAMOTIDINE 20 MG: 10 INJECTION, SOLUTION INTRAVENOUS at 08:21

## 2017-10-25 RX ADMIN — SODIUM CHLORIDE 125 ML/HR: 900 INJECTION, SOLUTION INTRAVENOUS at 04:31

## 2017-10-25 NOTE — DISCHARGE INSTRUCTIONS
DISCHARGE SUMMARY from Nurse    PATIENT INSTRUCTIONS:    After general anesthesia or intravenous sedation, for 24 hours or while taking prescription Narcotics:  · Limit your activities  · Do not drive and operate hazardous machinery  · Do not make important personal or business decisions  · Do  not drink alcoholic beverages  · If you have not urinated within 8 hours after discharge, please contact your surgeon on call. Report the following to your surgeon:  · Excessive pain, swelling, redness or odor of or around the surgical area  · Temperature over 100.5  · Nausea and vomiting lasting longer than 4 hours or if unable to take medications  · Any signs of decreased circulation or nerve impairment to extremity: change in color, persistent  numbness, tingling, coldness or increase pain  · Any questions    What to do at Home:  Recommended activity: Activity as tolerated,     If you experience any of the following symptoms fever, chills, new or unrelieved pain, persistent nausea or vomiting, please follow up with PCP. *  Please give a list of your current medications to your Primary Care Provider. *  Please update this list whenever your medications are discontinued, doses are      changed, or new medications (including over-the-counter products) are added. *  Please carry medication information at all times in case of emergency situations. These are general instructions for a healthy lifestyle:    No smoking/ No tobacco products/ Avoid exposure to second hand smoke  Surgeon General's Warning:  Quitting smoking now greatly reduces serious risk to your health.     Obesity, smoking, and sedentary lifestyle greatly increases your risk for illness    A healthy diet, regular physical exercise & weight monitoring are important for maintaining a healthy lifestyle    You may be retaining fluid if you have a history of heart failure or if you experience any of the following symptoms:  Weight gain of 3 pounds or more overnight or 5 pounds in a week, increased swelling in our hands or feet or shortness of breath while lying flat in bed. Please call your doctor as soon as you notice any of these symptoms; do not wait until your next office visit. Recognize signs and symptoms of STROKE:    F-face looks uneven    A-arms unable to move or move unevenly    S-speech slurred or non-existent    T-time-call 911 as soon as signs and symptoms begin-DO NOT go       Back to bed or wait to see if you get better-TIME IS BRAIN. Warning Signs of HEART ATTACK     Call 911 if you have these symptoms:   Chest discomfort. Most heart attacks involve discomfort in the center of the chest that lasts more than a few minutes, or that goes away and comes back. It can feel like uncomfortable pressure, squeezing, fullness, or pain.  Discomfort in other areas of the upper body. Symptoms can include pain or discomfort in one or both arms, the back, neck, jaw, or stomach.  Shortness of breath with or without chest discomfort.  Other signs may include breaking out in a cold sweat, nausea, or lightheadedness. Don't wait more than five minutes to call 911 - MINUTES MATTER! Fast action can save your life. Calling 911 is almost always the fastest way to get lifesaving treatment. Emergency Medical Services staff can begin treatment when they arrive -- up to an hour sooner than if someone gets to the hospital by car. The discharge information has been reviewed with the {PATIENT PARENT GUARDIAN:63496}. The {PATIENT PARENT GUARDIAN:19091} verbalized understanding. Discharge medications reviewed with the {Dishcarge meds reviewed MU:95402} and appropriate educational materials and side effects teaching were provided.   ___________________________________________________________________________________________________________________________________

## 2017-10-25 NOTE — DISCHARGE SUMMARY
Physician Discharge Summary       Patient: Kevin Bowman MRN: [id number]  SSN: [id number]    YOB: 1980  Age: 43 y.o. Sex: male    PCP: None    Admit date: 10/22/2017  Admitting Provider: Everardo Faust MD    Discharge date: 10/25/2017  Discharging Provider: Everett Brunner, MD    * Admission Diagnoses: Sepsis [facility])    * Discharge Diagnoses:    Hospital Problems as of 10/25/2017  Never Reviewed          Codes Class Noted - Resolved POA    Leukopenia ICD-10-CM: D72.819  ICD-9-CM: 288.50  10/22/2017 - Present Yes        Thrombocytopenia ([facility] ICD-10-CM: D69.6  ICD-9-CM: 287.5  10/22/2017 - Present Yes        Elevated liver enzymes ICD-10-CM: R74.8  ICD-9-CM: 790.5  10/22/2017 - Present Yes        Anxiety (Chronic) ICD-10-CM: F41.9  ICD-9-CM: 300.00  10/22/2017 - Present Yes        * (Principal)RESOLVED: Sepsis ([facility] ICD-10-CM: A41.9  ICD-9-CM: 038.9, 995.91  10/22/2017 - 10/25/2017 Yes        RESOLVED: Rhabdomyolysis ICD-10-CM: P37.68  ICD-9-CM: 728.88  10/22/2017 - 10/25/2017 Yes        RESOLVED: BERTIN (acute kidney injury) ([facility] ICD-10-CM: N17.9  ICD-9-CM: 584.9  10/22/2017 - 10/25/2017 Yes              * Hospital Course: 36M with anxiety and depression on chronic benzo came with 1 week of Nausea, vomiting, fever and LLQ abdominal pain. Patient was initially in the ER but left AMA. He return when fever and malaise persisted. Abdominal pain had resolved. On presentation he was Leukopenic 2.1, Thrombocytopenic 99, elevated transaminases and CPK.  CXR w/o acute pathology. CT abdomen/pelvis 10/21 with multiple lymph nodes in rectosigmoid junction. Patient admitted to the hospitalist service. Due to concerns about sepsis, patient was started on broad spectrum IV antibiotics with Cefepime and Vanco pending cultures and other work up. Blood and Urine cultures were negative. CT abdo pelvis showed rectal sigmoid LAD. ID and GI were consulted.  ID did not think it was bacterial infection, antibiotics were discontinued. Viral panels including HIV PCR, EBV, CMV and acute hepatitis B and C panel were sent. GI recommended conservative management and awaiting viral testing. Pt improved clinically, fever has abated and he is able to tolerate oral intake. CPK elevated on presentation trended down with IVF  Patient wants to be DCed today. Discussed with ID. Patient was deemed stable to be discharged home. He will follow up with ID clinic for pending labs and repeat CBC to monitor WBC. * Procedures: routine labs and imaging   * No surgery found *      Consults: ID and GI    Significant Diagnostic Studies: labs  and radiology  [address] [place] [address]    Result Date: 10/23/2017  IMPRESSION: Unremarkable exam    Xr Chest Port    Result Date: 10/22/2017  IMPRESSION: Normal chest.         CBC W/O DIFF    Collection Time: 10/25/17  5:57 AM   Result Value Ref Range    WBC 2.9 (L) 4.3 - 11.1 K/uL    RBC 3.80 (L) 4.23 - 5.67 M/uL    HGB 11.8 (L) 13.6 - 17.2 g/dL    HCT 36.3 (L) 41.1 - 50.3 %    MCV 95.5 79.6 - 97.8 FL    MCH 31.1 26.1 - 32.9 PG    MCHC 32.5 31.4 - 35.0 g/dL    RDW 14.0 11.9 - 14.6 %    PLATELET 59 (L) 025 - 450 K/uL    MPV 12.9 10.8 - 14.1 FL     BMP:   Lab Results   Component Value Date/Time     10/25/2017 05:57 AM    K 3.6 10/25/2017 05:57 AM     (H) 10/25/2017 05:57 AM    CO2 25 10/25/2017 05:57 AM    AGAP 9 10/25/2017 05:57 AM    GLU 96 10/25/2017 05:57 AM    BUN 2 (L) 10/25/2017 05:57 AM    CREA 0.61 (L) 10/25/2017 05:57 AM    GFRAA >60 10/25/2017 05:57 AM    GFRNA >60 10/25/2017 05:57 AM          Discharge Exam:  Visit Vitals    /68    Pulse 80    Temp 98.4 °F (36.9 °C)    Resp 16    Ht 5' 11\" (1.803 m)    Wt 96.6 kg (213 lb)    SpO2 97%    BMI 29.71 kg/m2     General appearance: alert, cooperative, no distress, appears stated age  Eyes: conjunctivae/corneas clear. PERRL, EOM's intact.   Lungs: clear to auscultation bilaterally  Heart: regular rate and rhythm, S1, S2 normal, no murmur, click, rub or gallop  Abdomen: soft, non-tender. Bowel sounds normal. No masses,  no organomegaly  Extremities: extremities normal, atraumatic, no cyanosis or edema, no rash or petechia    * Discharge Condition: good and stable  * Disposition: Home    Discharge Medications:  Discharge Medication List as of 10/25/2017 11:29 AM      CONTINUE these medications which have NOT CHANGED    Details   ALPRAZolam (XANAX) 1 mg tablet Take 1 mg by mouth three (3) times daily as needed for Anxiety. , Historical Med      escitalopram oxalate (LEXAPRO) 10 mg tablet Take 10 mg by mouth daily. , Historical Med         STOP taking these medications       ibuprofen (ADVIL) 200 mg tablet Comments:   Reason for Stopping:         ondansetron (ZOFRAN ODT) 4 mg disintegrating tablet Comments:   Reason for Stopping:               * Follow-up Care/Patient Instructions:   Activity: Activity as tolerated  Diet: Regular Diet  Wound Care: None needed    Follow-up Information     Follow up With Details Comments Contact Info    keep your scheduled appointment with DONNA Ibarra NP On 11/1/2017 2:00 pm  [address]  [phone]            Time spent coordinating DC: 35mins    Signed:  Pauline Damico MD  10/25/2017  5:17 PM

## 2017-10-25 NOTE — PROGRESS NOTES
Infectious Disease Progress Note    Today's Date: 10/25/2017   Admit Date: 10/22/2017    Impression:   · Acute febrile illness seems to have resolved  · Transaminitis  · Elevated CK trending down  · Fevers resolved    Plan:   · Continue to observe off abx  · HIV serum PCR,EBV ab panel, CMV Igm/IgG pending  · Acute hepatitis b and c panel  (-)  · Patient needs to be discharged today  · ID would follow-up with patient in OP setting for review of pending studies; will discuss with Dr. Natalie Schumacher  · ID appointment scheduled for 17 @ 1400 with Tejas Lafleur    Anti-infectives:     Inpat Anti-Infectives     Start     Ordered Stop    10/24/17 0900  vanc trough reminder  Other,   ONCE      10/23/17 1028 10/24/17 2059    10/23/17 2100  cefepime (MAXIPIME) 1 g in 0.9% sodium chloride (MBP/ADV) 50 mL  1 g,   IntraVENous,   EVERY 12 HOURS      10/23/17 1025 --    10/23/17 1000  vancomycin (VANCOCIN) 1500 mg in  ml infusion  1,500 mg,   IntraVENous,   EVERY 12 HOURS      10/23/17 0207 --          Subjective:   Date of Consultation:  2017  Referring Physician: Dusty Sarabia  Feeling much improved now. Appetite has returned; keeping food down. No fever spikes. No diarrhea. No Known Allergies     Review of Systems:  A comprehensive review of systems was negative except for that written in the History of Present Illness. Objective:     Visit Vitals    BP 97/62    Pulse 84    Temp 98.5 °F (36.9 °C)    Resp 16    Ht 5' 11\" (1.803 m)    Wt 96.6 kg (213 lb)    SpO2 96%    BMI 29.71 kg/m2     Temp (24hrs), Av.5 °F (36.9 °C), Min:98.3 °F (36.8 °C), Max:98.7 °F (37.1 °C)       Lines:  Peripheral IV:            Physical Exam:    General:  Alert, cooperative, well noursished, well developed, appears stated age   Eyes:  Sclera anicteric. Pupils equally round and reactive to light.    Mouth/Throat: Mucous membranes normal, oral pharynx clear   Neck: Supple   Lungs:   Clear to auscultation bilaterally, good effort   CV: Regular rate and rhythm,no murmur, click, rub or gallop   Abdomen:   Soft, non-tender. bowel sounds normal. non-distended   Extremities: No cyanosis or edema   Skin: Skin color, texture, turgor normal. no acute rash or lesions   Lymph nodes: Cervical and supraclavicular normal   Musculoskeletal: No swelling or deformity   Lines/Devices:  Intact, no erythema, drainage or tenderness   Psych: Alert and oriented, normal mood affect given the setting         Data Review:     CBC:  Recent Labs      10/25/17   0557  10/24/17   0545  10/23/17   1107  10/22/17   2015   WBC  2.9*  1.8*  1.8*  2.2*   GRANS   --    --   58  67   MONOS   --    --   6  9   EOS   --    --   0*  0*   ANEU   --    --   1.0*  1.5*   ABL   --    --   0.6  0.5   HGB  11.8*  12.0*  12.5*  13.9   HCT  36.3*  35.9*  37.2*  40.0*   PLT  59*  53*  57*  65*       BMP:  Recent Labs      10/25/17   0557  10/24/17   0545  10/23/17   1956   CREA  0.61*  0.71*  0.85   BUN  2*  2*  3*   NA  145  143  142   K  3.6  3.8  3.6   CL  111*  109*  109*   CO2  25  25  26   AGAP  9  9  7   GLU  96  94  115*       LFTS:  Recent Labs      10/23/17   1107  10/22/17   2015   TBILI  0.4  0.4   ALT  72*  78*   SGOT  138*  133*   AP  52  60   TP  6.0*  7.0   ALB  3.0*  3.6       Microbiology:     All Micro Results     Procedure Component Value Units Date/Time    CULTURE, URINE [[id number]] Collected:  10/22/17 2252    Order Status:  Completed Specimen:  Urine from Clean catch Updated:  10/25/17 0717     Special Requests: NO SPECIAL REQUESTS        Culture result: NO GROWTH 2 DAYS       CULTURE, STOOL [[id number]] Collected:  10/23/17 2242    Order Status:  Completed Specimen:  Stool Updated:  10/24/17 0996     Special Requests: NO SPECIAL REQUESTS        Culture result:         NO GROWTH AFTER SHORT PERIOD OF INCUBATION. FURTHER RESULTS TO FOLLOW AFTER OVERNIGHT INCUBATION.     CULTURE, BLOOD [[id number]] Collected:  10/22/17 2015    Order Status:  Completed Specimen:  Blood from Blood Updated:  10/24/17 0714     Special Requests: RIGHT HAND        Culture result: NO GROWTH 2 DAYS       CULTURE, BLOOD [[id number]] Collected:  10/22/17 2025    Order Status:  Completed Specimen:  Blood from Blood Updated:  10/24/17 0714     Special Requests: LEFT ANTECUBITAL        Culture result: NO GROWTH 2 DAYS       C. DIFFICILE/EPI PCR [[id number]]     Order Status:  Sent Specimen:  Stool     INFLUENZA A & B AG (RAPID TEST) [[id number]] Collected:  10/22/17 2030    Order Status:  Completed Specimen:  Nasopharyngeal from Nasal washing Updated:  10/22/17 2052     Influenza A Ag NEGATIVE          NEGATIVE FOR THE PRESENCE OF INFLUENZA A ANTIGEN  INFECTION DUE TO INFLUENZA A CANNOT BE RULED OUT. BECAUSE THE ANTIGEN PRESENT IN THE SAMPLE MAY BE BELOW  THE DETECTION LIMIT OF THE TEST. A NEGATIVE TEST IS PRESUMPTIVE AND IT IS RECOMMENDED THAT THESE RESULTS BE CONFIRMED BY VIRAL CULTURE OR AN FDA-CLEARED INFLUENZA A AND B MOLECULAR ASSAY. Influenza B Ag NEGATIVE          NEGATIVE FOR THE PRESENCE OF INFLUENZA B ANTIGEN  INFECTION DUE TO INFLUENZA B CANNOT BE RULED OUT. BECAUSE THE ANTIGEN PRESENT IN THE SAMPLE MAY BE BELOW  THE DETECTION LIMIT OF THE TEST. A NEGATIVE TEST IS PRESUMPTIVE AND IT IS RECOMMENDED THAT THESE RESULTS BE CONFIRMED BY VIRAL CULTURE OR AN FDA-CLEARED INFLUENZA A AND B MOLECULAR ASSAY. Imaging:   Abd ultrasound (10/22/17): IMPRESSION: Unremarkable exam    CXR (10/22/17): IMPRESSION: Normal chest    CT abd/pelv (10/21/17): IMPRESSION:  Multiple small lymph nodes in the rectosigmoid junction region. Recommend checking the patient's PSA level. Recommend elective colonoscopy. No definite focal colon mass is identified. Minimal bibasilar atelectasis.     Signed By: Marquis Lindsey NP     October 25, 2017

## 2017-10-25 NOTE — PROGRESS NOTES
END OF SHIFT NOTE:    INTAKE/OUTPUT  10/24 0701 - 10/25 0700  In: 2982 [I.V.:2982]  Out: 4670 [Urine:2875]  Voiding: YES  Catheter: NO  Drain:              Flatus: Patient does have flatus present. Stool:  1 occurrences. Characteristics:  Stool Assessment  Stool Color: Brown  Stool Appearance: Formed  Stool Amount: Small  Stool Source/Status: Rectum    Emesis: 0 occurrences. Characteristics:        VITAL SIGNS  Patient Vitals for the past 12 hrs:   Temp Pulse Resp BP SpO2   10/25/17 0400 98.3 °F (36.8 °C) 79 18 98/64 94 %   10/25/17 0000 98.4 °F (36.9 °C) 76 18 103/68 97 %   10/24/17 2000 98.7 °F (37.1 °C) 88 16 101/65 97 %       Pain Assessment  Pain Intensity 1: 0 (10/24/17 1157)  Pain Location 1: Abdomen     Patient Stated Pain Goal: 0    Ambulating  Yes    Shift report given to oncoming nurse at the bedside.     Queen Hair RN

## 2017-10-26 LAB
BACTERIA SPEC CULT: NORMAL
CMV IGG SERPL IA-ACNC: 9.2 U/ML (ref 0–0.59)
CMV IGM SERPL IA-ACNC: <30 AU/ML (ref 0–29.9)
EBV EA IGG SER-ACNC: <9 U/ML (ref 0–8.9)
EBV NA IGG SER-ACNC: >600 U/ML (ref 0–17.9)
EBV VCA IGG SER-ACNC: >600 U/ML (ref 0–17.9)
EBV VCA IGM SER-ACNC: <36 U/ML (ref 0–35.9)
HAV IGM SERPL QL IA: NEGATIVE
HBV CORE IGM SERPL QL IA: NEGATIVE
HBV SURFACE AG SERPL QL IA: NEGATIVE
HCV AB S/CO SERPL IA: <0.1 S/CO RATIO (ref 0–0.9)
HIV1 RNA # SERPL NAA+PROBE: NORMAL COPIES/ML
HIV1 RNA SERPL NAA+PROBE-LOG#: 6.79 LOG10COPY/ML
INTERPRETATION, 169995: ABNORMAL
PATH REV BLD -IMP: NORMAL
SERVICE CMNT-IMP: NORMAL

## 2017-10-27 LAB
BACTERIA SPEC CULT: NORMAL
BACTERIA SPEC CULT: NORMAL
SERVICE CMNT-IMP: NORMAL
SERVICE CMNT-IMP: NORMAL

## 2022-02-17 PROBLEM — R20.2 PARESTHESIA: Status: ACTIVE | Noted: 2022-02-17

## 2022-02-17 PROBLEM — R29.3 POSTURAL IMBALANCE: Status: ACTIVE | Noted: 2022-02-17

## 2022-02-17 PROBLEM — R29.898 WEAKNESS OF BOTH LEGS: Status: ACTIVE | Noted: 2022-02-17

## 2022-03-18 PROBLEM — R74.8 ELEVATED LIVER ENZYMES: Status: ACTIVE | Noted: 2017-10-22

## 2022-03-19 PROBLEM — D69.6 THROMBOCYTOPENIA (HCC): Status: ACTIVE | Noted: 2017-10-22

## 2022-03-19 PROBLEM — R29.898 WEAKNESS OF BOTH LEGS: Status: ACTIVE | Noted: 2022-02-17

## 2022-03-19 PROBLEM — R29.3 POSTURAL IMBALANCE: Status: ACTIVE | Noted: 2022-02-17

## 2022-03-19 PROBLEM — D72.819 LEUKOPENIA: Status: ACTIVE | Noted: 2017-10-22

## 2022-03-20 PROBLEM — F41.9 ANXIETY: Status: ACTIVE | Noted: 2017-10-22

## 2022-03-20 PROBLEM — R20.2 PARESTHESIA: Status: ACTIVE | Noted: 2022-02-17

## 2022-06-21 ENCOUNTER — OFFICE VISIT (OUTPATIENT)
Dept: NEUROLOGY | Age: 42
End: 2022-06-21
Payer: COMMERCIAL

## 2022-06-21 VITALS — HEIGHT: 71 IN | BODY MASS INDEX: 32.5 KG/M2

## 2022-06-21 DIAGNOSIS — R20.2 NUMBNESS AND TINGLING IN BOTH HANDS: ICD-10-CM

## 2022-06-21 DIAGNOSIS — R20.0 NUMBNESS AND TINGLING IN BOTH HANDS: ICD-10-CM

## 2022-06-21 DIAGNOSIS — R20.2 PARESTHESIA: Primary | ICD-10-CM

## 2022-06-21 PROCEDURE — 99213 OFFICE O/P EST LOW 20 MIN: CPT | Performed by: PSYCHIATRY & NEUROLOGY

## 2022-06-21 PROCEDURE — 95913 NRV CNDJ TEST 13/> STUDIES: CPT | Performed by: PSYCHIATRY & NEUROLOGY

## 2022-06-21 PROCEDURE — 95885 MUSC TST DONE W/NERV TST LIM: CPT | Performed by: PSYCHIATRY & NEUROLOGY

## 2022-06-21 ASSESSMENT — VISUAL ACUITY: OU: 1

## 2022-06-21 ASSESSMENT — ENCOUNTER SYMPTOMS
EYES NEGATIVE: 1
RESPIRATORY NEGATIVE: 1

## 2022-06-21 NOTE — PROGRESS NOTES
NEUROLOGY  RETURN  OFFICE VISIT [de-identified]                     6/21/2022  Akbar Payne is a 35 y.o. male here for [de-identified]  Neuritis numbness hands. Intermittent. EMG today upper extremities      Referred by     Chon Freeman MD     Chief Complaint:   Paresthesias without significant dysesthesia. 57-year-old man with retroviral infection medications include apparently just S-Citalopram and alprazolam.  Intermittent numbness. Today for hands and EMG. Last visit 2/17/2020 =   1. Paresthesia   -     MOTOR &/SENS 9-10 NRV CNDJ PRECONF ELTRODE LIMB   -     NEEDLE EMG EA EXTREMITY W/PARASPINL AREA LIMITED      2. Weakness of both legs   -     MOTOR &/SENS 9-10 NRV CNDJ PRECONF ELTRODE LIMB   -     NEEDLE EMG EA EXTREMITY W/PARASPINL AREA LIMITED      3. Postural imbalance   -     MOTOR &/SENS 9-10 NRV CNDJ PRECONF ELTRODE LIMB   -     NEEDLE EMG EA EXTREMITY W/PARASPINL AREA LIMITED      4. Abnormal gait      1. Distal lower extremity sensorimotor polyneuropathy compatible with underlying disorders. See EMG NCV report. The Diagnosis and differential diagnostic considerations, and Rx Tx were reviewed with the patient at length. Seen with retro viral disorders, HIV, diabetes etc.  Might be worthwhile to trial pain medication if needed for neuropathic pain. Active Problems:    * No active hospital problems. *  Resolved Problems:    * No resolved hospital problems. *    Past Medical History:   Diagnosis Date    Psychiatric disorder      No past surgical history on file. No family history on file.   Social History     Socioeconomic History    Marital status: Single     Spouse name: Not on file    Number of children: Not on file    Years of education: Not on file    Highest education level: Not on file   Occupational History    Not on file   Tobacco Use    Smoking status: Current Every Day Smoker    Smokeless tobacco: Never Used   Substance and Sexual Activity    Alcohol use: No    tremors, seizures, syncope, facial asymmetry, speech difficulty, light-headedness and headaches. Hematological: Negative. Psychiatric/Behavioral: Negative. All other systems reviewed and are negative. REVIEW IMAGING: none. Objective:     Vitals:    06/21/22 1440   Height: 5' 11\" (1.803 m)        Physical Exam  Vitals reviewed. Constitutional:       General: He is awake. He is not in acute distress. Appearance: He is well-developed and well-groomed. He is not ill-appearing, toxic-appearing or diaphoretic. HENT:      Head: Normocephalic and atraumatic. No abrasion, right periorbital erythema or left periorbital erythema. Right Ear: Hearing normal.      Left Ear: Hearing normal.   Eyes:      General: Lids are normal. Vision grossly intact. No scleral icterus. Right eye: No discharge. Left eye: No discharge. Extraocular Movements: Extraocular movements intact. Right eye: Normal extraocular motion and no nystagmus. Left eye: Normal extraocular motion and no nystagmus. Conjunctiva/sclera: Conjunctivae normal.      Right eye: Right conjunctiva is not injected. Left eye: Left conjunctiva is not injected. Pupils: Pupils are equal, round, and reactive to light. Neck:      Trachea: Phonation normal.   Pulmonary:      Effort: Pulmonary effort is normal. No respiratory distress. Breath sounds: No wheezing. Musculoskeletal:         General: No swelling, tenderness, deformity or signs of injury. Normal range of motion. Cervical back: Normal range of motion. No rigidity or torticollis. No pain with movement. Normal range of motion. Right lower leg: No edema. Left lower leg: No edema. Skin:     General: Skin is warm and dry. Capillary Refill: Capillary refill takes less than 2 seconds. Coloration: Skin is not cyanotic, jaundiced or pale. Nails: There is no clubbing.    Neurological:      Mental Status: He is alert and easily aroused. Mental status is at baseline. Cranial Nerves: No cranial nerve deficit, dysarthria or facial asymmetry. Motor: No weakness, tremor, atrophy or seizure activity. Coordination: Coordination is intact. Coordination normal.      Gait: Gait is intact. Gait normal.   Psychiatric:         Attention and Perception: Attention normal.         Mood and Affect: Mood normal.         Speech: Speech normal.         Behavior: Behavior normal. Behavior is cooperative. Neurologic Exam     Mental Status   Attention: normal. Concentration: normal.   Speech: speech is normal   Level of consciousness: alert  Knowledge: good. Normal comprehension. Cranial Nerves   Cranial nerves II through XII intact. CN III, IV, VI   Pupils are equal, round, and reactive to light. Motor Exam   Muscle bulk: normal  Overall muscle tone: normal    Gait, Coordination, and Reflexes     Gait  Gait: normal    Tremor   Resting tremor: absent  Intention tremor: absent  Action tremor: absent  There is no tic, twitch, tonic or clonic activity noted. No dyskinesia. Assessment & Plan     Diagnoses and all orders for this visit:    Paresthesia    EMG is normal upper extremities please see report. However his previous EMG of the lower extremities did reveal peroneal neuropathy. Overall etiology might be retroviral therapy or retrovirus itself. At this point no particulars to really tell the difference. He does not have any neuropathic pain so I see very little to really help although he could try low-dose gabapentin if it gets worse. Other similar medications may help. At this point he seems to be doing quite well so I see little to really give therapy 4. Patient was made fully aware. I can see him as needed. All drugs and rx reviewed fully with patient and acknowledged spcific to neurology as well. Differential diagnostic decisions and thoughts reviewed and discussed. Updating to ID pt, coordinate neurology visits, reasons for follow, review neurologic problems. Extensive time[de-identified]  Total time  21 minutes - extra to EMG. More than 50% of this visit was spent in counseling and care coordination. Treatment options fully reviewed with patient. Time includes pre-  and post- face-face time in records review, and preparation including available pertinent images and reports. Acknowledgements obtained where needed.    [ *NOTE: parts of this note were produced using artificial speech recognition software, which may have inadvertent errors in the produced wordings. ]          Alonso Dickens MD  Consultative Neurology, [address]  Phone:  [phone]  Fax:   [phone]        Signed By: Alonso Dickens MD     June 21, 2022

## 2022-06-21 NOTE — PROGRESS NOTES
EMG/Nerve Conduction Study Procedure Note  [address]   [phone]      Hx:    Exam:     35 y.o.y.o. male numbness and tingling of the hands. Previous EMG dated 2/17/2022 of the lower extremities revealed some peroneal neuropathies. Summary        needle EMG of selected hand muscles as described below which were sampled. Conduction velocity testing. 1.      Controlled environmental factors / EMG lab. Temperature. 2. NCV : sensory segments:    Normal SCV SNAP of the bilateral median bilateral ulnar and radial nerves. The bilateral median inching also normal.  3. NCV transcarpal sensory segments:     Normal bilateral transcarpal NCV = SCV SNAP. 4. NCV Motor MCV segments: Normal bilateral median and ulnar both the ulnar to the ADM and ulnar to the FDI CMAP and MCV. 5. F-wave studies:       Normal bilateral median bilateral ulnar F waves. 6. H-REFLEX Studies:    Deferred. 7.  NEEDLE EMG:   Tested muscles[de-identified]    Bilateral FDI APB ADM muscles are normal =Normal insertional activity and interference pattern/recruitment. No fasciculations fibrillations positive sharp waves. Normal MUP. No BSS AP. No giant MUP. No myotonia. No upper motor neuron sign. INTERPRETATION:          INTERPRETATION:       NORMAL STUDIES. THERE IS NO ELECTROPHYSIOLOGIC EVIDENCE OF NEUROPATHY, DENERVATION, RADICULOPATHY, PLEXOPATHY, MYOPATHY, MYOTONIA, OR FASCICULATIONS IN THE TESTED SEGMENTS  OF  BOTH UPPER   EXTREMITIES. CONCLUSION:      NORMAL UPPER  STUDIES. Procedure Details:       FURTHER clinical correlation is recommended and warranted as studies are within normal limits. Patient is made aware. Please Note[de-identified]     Data and waveforms * filed under Procedure category [facility]. See Procedure Files for complete data pages.                 David Griffin MD  Consultative Neurology, Neurodiagnostics   [facility] [address] [place] [address]  Phone:  [phone]  Fax:   [phone]          + + +   Glossary:   MUP: motor unit potential;  SNAP: sensory nerve action potential; Fibs:  Fibrillations; Fascic: fasciculations; IA: insertional activity;  IP: interference pattern;  SCV :  Sensory conduction velocity;  MCV: motor conduction velocity; NOTE[de-identified] muscles are abbreviated latin initials. [place] raw datafile[de-identified]   * filed at Procedure or [employer].  *

## 2023-04-20 ENCOUNTER — TRANSCRIBE ORDERS (OUTPATIENT)
Dept: SCHEDULING | Age: 43
End: 2023-04-20

## 2023-04-20 DIAGNOSIS — R55 SYNCOPE AND COLLAPSE: Primary | ICD-10-CM

## 2023-05-04 ENCOUNTER — HOSPITAL ENCOUNTER (OUTPATIENT)
Dept: MRI IMAGING | Age: 43
End: 2023-05-04
Payer: COMMERCIAL

## 2023-05-04 ENCOUNTER — HOSPITAL ENCOUNTER (OUTPATIENT)
Dept: ULTRASOUND IMAGING | Age: 43
End: 2023-05-04
Payer: COMMERCIAL

## 2023-05-04 ENCOUNTER — HOSPITAL ENCOUNTER (OUTPATIENT)
Dept: NON INVASIVE DIAGNOSTICS | Age: 43
Discharge: HOME | End: 2023-05-06
Payer: COMMERCIAL

## 2023-05-04 DIAGNOSIS — R55 SYNCOPE AND COLLAPSE: ICD-10-CM

## 2023-05-04 LAB
ECHO AO ASC DIAM: 2.3 CM
ECHO AO ROOT DIAM: 2.8 CM
ECHO AV AREA PEAK VELOCITY: 2.8 CM2
ECHO AV AREA VTI: 2.8 CM2
ECHO AV MEAN GRADIENT: 4 MMHG
ECHO AV MEAN VELOCITY: 0.9 M/S
ECHO AV PEAK GRADIENT: 6 MMHG
ECHO AV PEAK VELOCITY: 1.3 M/S
ECHO AV VELOCITY RATIO: 0.85
ECHO AV VTI: 24.8 CM
ECHO EST RA PRESSURE: 3 MMHG
ECHO IVC PROX: 1.6 CM
ECHO LA AREA 2C: 12.4 CM2
ECHO LA AREA 4C: 18.2 CM2
ECHO LA DIAMETER: 3.4 CM
ECHO LA MAJOR AXIS: 5.2 CM
ECHO LA MINOR AXIS: 4.2 CM
ECHO LA TO AORTIC ROOT RATIO: 1.21
ECHO LA VOL 2C: 30 ML (ref 18–58)
ECHO LA VOL 4C: 48 ML (ref 18–58)
ECHO LA VOL BP: 42 ML (ref 18–58)
ECHO LV E' LATERAL VELOCITY: 13 CM/S
ECHO LV E' SEPTAL VELOCITY: 10 CM/S
ECHO LV EDV A2C: 82 ML
ECHO LV EDV A4C: 107 ML
ECHO LV EJECTION FRACTION A2C: 51 %
ECHO LV EJECTION FRACTION A4C: 53 %
ECHO LV EJECTION FRACTION BIPLANE: 52 % (ref 55–100)
ECHO LV ESV A2C: 40 ML
ECHO LV ESV A4C: 50 ML
ECHO LV FRACTIONAL SHORTENING: 34 % (ref 28–44)
ECHO LV INTERNAL DIMENSION DIASTOLIC: 4.4 CM (ref 4.2–5.9)
ECHO LV INTERNAL DIMENSION SYSTOLIC: 2.9 CM
ECHO LV IVSD: 0.7 CM (ref 0.6–1)
ECHO LV MASS 2D: 92.1 G (ref 88–224)
ECHO LV POSTERIOR WALL DIASTOLIC: 0.7 CM (ref 0.6–1)
ECHO LV RELATIVE WALL THICKNESS RATIO: 0.32
ECHO LVOT AREA: 3.1 CM2
ECHO LVOT AV VTI INDEX: 0.9
ECHO LVOT DIAM: 2 CM
ECHO LVOT MEAN GRADIENT: 3 MMHG
ECHO LVOT PEAK GRADIENT: 5 MMHG
ECHO LVOT PEAK VELOCITY: 1.1 M/S
ECHO LVOT SV: 70 ML
ECHO LVOT VTI: 22.3 CM
ECHO MV A VELOCITY: 0.62 M/S
ECHO MV E DECELERATION TIME (DT): 256 MS
ECHO MV E VELOCITY: 0.52 M/S
ECHO MV E/A RATIO: 0.84
ECHO MV E/E' LATERAL: 4
ECHO MV E/E' RATIO (AVERAGED): 4.6
ECHO MV E/E' SEPTAL: 5.2
ECHO PV ACCELERATION TIME (AT): 129 MS
ECHO PV MAX VELOCITY: 0.9 M/S
ECHO PV PEAK GRADIENT: 3 MMHG
ECHO RV BASAL DIMENSION: 3.7 CM
ECHO RV FREE WALL PEAK S': 11 CM/S
ECHO RV INTERNAL DIMENSION: 2.7 CM
ECHO RV TAPSE: 2.3 CM (ref 1.7–?)

## 2023-05-04 PROCEDURE — 93880 EXTRACRANIAL BILAT STUDY: CPT

## 2023-05-04 PROCEDURE — 93306 TTE W/DOPPLER COMPLETE: CPT

## 2023-05-04 PROCEDURE — 93306 TTE W/DOPPLER COMPLETE: CPT | Performed by: INTERNAL MEDICINE

## 2023-05-08 ENCOUNTER — HOSPITAL ENCOUNTER (OUTPATIENT)
Dept: MRI IMAGING | Age: 43
Discharge: HOME | End: 2023-05-11
Payer: COMMERCIAL

## 2023-05-08 PROCEDURE — 70553 MRI BRAIN STEM W/O & W/DYE: CPT

## 2023-05-08 PROCEDURE — A9579 GAD-BASE MR CONTRAST NOS,1ML: HCPCS | Performed by: FAMILY MEDICINE

## 2023-05-08 PROCEDURE — 6360000004 HC RX CONTRAST MEDICATION: Performed by: FAMILY MEDICINE

## 2023-05-08 RX ADMIN — GADOTERIDOL 17 ML: 279.3 INJECTION, SOLUTION INTRAVENOUS at 18:06

## 2023-05-22 RX ORDER — GABAPENTIN 300 MG/1
CAPSULE ORAL
Qty: 90 CAPSULE | Refills: 2 | OUTPATIENT
Start: 2023-05-22